# Patient Record
Sex: FEMALE | Race: WHITE | Employment: OTHER | URBAN - METROPOLITAN AREA
[De-identification: names, ages, dates, MRNs, and addresses within clinical notes are randomized per-mention and may not be internally consistent; named-entity substitution may affect disease eponyms.]

---

## 2017-01-16 ENCOUNTER — OFFICE VISIT (OUTPATIENT)
Dept: RHEUMATOLOGY | Facility: PHYSICIAN GROUP | Age: 82
End: 2017-01-16
Payer: MEDICARE

## 2017-01-16 VITALS — DIASTOLIC BLOOD PRESSURE: 60 MMHG | SYSTOLIC BLOOD PRESSURE: 120 MMHG | WEIGHT: 95 LBS

## 2017-01-16 DIAGNOSIS — R41.3 MEMORY LOSS: ICD-10-CM

## 2017-01-16 DIAGNOSIS — M81.0 OSTEOPOROSIS: ICD-10-CM

## 2017-01-16 DIAGNOSIS — M05.9 SEROPOSITIVE RHEUMATOID ARTHRITIS (HCC): ICD-10-CM

## 2017-01-16 PROCEDURE — 99214 OFFICE O/P EST MOD 30 MIN: CPT | Performed by: INTERNAL MEDICINE

## 2017-01-16 NOTE — Clinical Note
[facility]   [address], [address]  [place] [address]  Phone: [phone]  Fax: [phone]              Encounter Date: 1/16/2017    Dear Dr. Scott ref. provider found,    It was a pleasure seeing your patient, Pham Sparks, on 1/16/2017. Diagnoses of Seropositive rheumatoid arthritis (CMS-HCC), Osteoporosis, and Memory loss were pertinent to this visit.     Please find attached progress note which includes the history I obtained from Ms. Sparks, my physical examination findings, my impression and recommendations.      Once again, it was a pleasure participating in your patient's care.  Please feel free to contact me if you have any questions or if I can be of any further assistance to your patients.      Sincerely,    Racquel Rousseau M.D.  Electronically Signed          PROGRESS NOTE:  No notes on file

## 2017-01-16 NOTE — PROGRESS NOTES
Chief Complaint- joint pain    Subjective:   Pham Sparks is a 89 y.o. female here today for follow up of rheumatological issues    This is a pleasant elderly patient here with her son who has Seropositive rheumatoid arthritis and currently takes methotrexate 10 mg by mouth every week.  Here to f/u, doing well, here with her son, no complaints, no unexplained weight loss, no recurrent infections, no FUO, no complaints of hair loss  No stomach problems from MTX  Pt states that she has no pain  Patient also with osteoporosis, currently on Prolia, last Prolia injection July 2016 and is scheduled for her next Prolia injection February 16, 2017.  Last DEXA was done 7/2/15 T scores -2.2 in LS spine, -6.8 in forearm,   Patient sees dermatology on a regular basis for actinic keratoses on the face. Patient today has a little bit of an upper respiratory infection with slight yellow phlegm production but no fevers, no shortness of breath.    S/p Plaquenil  S/p Fosamax    Left TKA    DEXA 7/2015 T scores -2.2, -6.8,   Last Prolia 7/2016, next due February 16, 2017      Current medicines (including changes today)  Current Outpatient Prescriptions   Medication Sig Dispense Refill   • folic acid (FOLVITE) 1 MG Tab TAKE 1 TABLET BY MOUTH EVERY DAY 90 Tab 3   • methotrexate 2.5 MG Tab 4 tabs po qweek 57 Tab 0   • levothyroxine (SYNTHROID) 100 MCG TABS Take 100 mcg by mouth every day.     • calcium carbonate (OS-KYLE 500) 1250 MG TABS Take 1,250 mg by mouth every day.     • atenolol (TENORMIN) 25 MG TABS Take 25 mg by mouth every day.     • Rivastigmine Tartrate (EXELON PO) Take  by mouth.     • denosumab (PROLIA) 60 MG/ML Solution Inject 60 mg as instructed Once.     • vitamin D (CHOLECALCIFEROL) 1000 UNIT TABS Take 1,000 Units by mouth every day.       Current Facility-Administered Medications   Medication Dose Route Frequency Provider Last Rate Last Dose   • denosumab (PROLIA) subq injection 60 mg  60 mg Subcutaneous  Q6 MO Racquel Rousseau M.D.         She  has a past medical history of Menopause; cholecystectomy; Actinic keratosis; Arthritis; and Skin carcinoma.    ROS   Other than what is mentioned in HPI or physical exam, there is no history of headaches, double vision or blurred vision. No temporal tenderness or jaw claudication. No history of cataracts or glaucoma. No trouble swallowing difficulties or sore throats. No history of thyroid disease. No chest complaints including chest pain, cough or sputum production. No shortness of breath. No GI complaints including nausea, vomiting, change in bowel habits, or past peptic ulcer disease. No history of blood in the stools. No urinary complaints including dysuria or frequency. No history of rash including psoriasis. No history of alopecia, photosensitivity, oral ulcerations, Raynaud's phenomena, or swollen joints. No history of gout. No back complaints. No history of low blood counts.       Objective:     Blood pressure 120/60, weight 43.092 kg (95 lb). There is no height on file to calculate BMI.   Physical Exam:  Consitution: Alert, oriented X 3 in no acute distress,Eye contact is good, speech goal directed, affect calmHEENT: conjunctiva non-injected, sclera non-icteric.Pinna normal. TM pearly gray, no injection of TMs. Oral: mucous membranes pink and moist with no lesions, there is mild postnasal drip but no evidence of exudates, no parotid gland enlargementNeck: No adenopathy or masses in the neck or supraclavicular regions.Lymph: No supraclavicular lymphadenopathy, no axillary lymphadenopathy, no cervical lymphadenopathyLungs: clear to auscultation bilaterally with good excursion.CV: regular rate and rhythm.Abdomen: soft, nontender, No CVAT, no HSMNeuro: Cranial nerves 2-12 are grossly intact, there is a slight intention tremor of the handsSkin: There are actinic keratoses on face,Ext: Patient has  soft fluctuant not inflamed cysts on the right ulnar styloid,  slight  PIP joint nodule, bunion deformities noted on great toes, no crossover toes, and no hammertoes noted on feet, There is 1+ pitting edema bilateral lower extremities, no ligament laxity noted neither on upper extremities or lower extremities, mild kyphosis of the upper back      Results for orders placed in visit on 07/02/15   DS-BONE DENSITY STUDY (DEXA)    Impression According to the World Health Organization classification, bone mineral density of this patient is osteoporosis with high risk of fracture.                INTERPRETING LOCATION:  [address] [place] [address], [place], [address]       Assessment and Plan:     1. Seropositive rheumatoid arthritis (CMS-HCC)  Continue methotrexate at 10 mg by mouth every week and folic acid 1 mg by mouth daily, labs to be done every 3 months, labs ordered today  - CBC WITH DIFFERENTIAL; Future  - COMP METABOLIC PANEL; Future  - WESTERGREN SED RATE; Future  - denosumab (PROLIA) subq injection 60 mg; Inject 1 mL as instructed every 6 months.    2. Osteoporosis  Patient will be due for a Prolia injection February 16, 2017,  - CBC WITH DIFFERENTIAL; Future  - COMP METABOLIC PANEL; Future  - WESTERGREN SED RATE; Future  - denosumab (PROLIA) subq injection 60 mg; Inject 1 mL as instructed every 6 months.    3. Memory loss    Followup: Return in about 3 months (around 4/16/2017). or sooner prn    Patient was seen 30 minutes face-to-face of which more than 50% of the time was spent counseling the patient (excluding time for procedures)  regarding  rheumatological condition and care. Therapy was discussed in detail.    Please note that this dictation was created using voice recognition software. I have made every reasonable attempt to correct obvious errors, but I expect that there are errors of grammar and possibly content that I did not discover before finalizing the note.

## 2017-01-16 NOTE — MR AVS SNAPSHOT
Pham GASTELUM Brieperthwasuzanna   2017 10:00 AM   Office Visit   MRN: [id number]    Department:  Rheumatology Med [facility]   Dept Phone:  [phone]    Description:  Female : 3/9/1925   Provider:  Racquel Rousseau M.D.           Reason for Visit     Follow-Up           Allergies as of 2017     No Known Allergies      You were diagnosed with     Seropositive rheumatoid arthritis (CMS-HCC)   [201788]       Osteoporosis   [0242252]       Memory loss   [780.93.ICD-9-CM]         Vital Signs     Blood Pressure Weight Smoking Status             120/60 mmHg 43.092 kg (95 lb) Former Smoker         Basic Information     Date Of Birth Sex Race Ethnicity Preferred Language    3/9/1925 Female White Unknown English      Your appointments     2017 10:00 AM   Infusion Other with [facility] INFUSION ROOM   [facility] (--)    [address]  [facility] [address] [phone]            May 17, 2017 10:15 AM   Follow Up Visit with Racquel Rousseau M.D.   [facility] (--)    [address], [address]  [facility] [address] [phone]           You will be receiving a confirmation call a few days before your appointment from our automated call confirmation system.              Problem List              ICD-10-CM Priority Class Noted - Resolved    Seropositive rheumatoid arthritis (CMS-HCC) M05.9   4/15/2014 - Present    Osteoporosis M81.0   4/15/2014 - Present    Memory loss R41.3   4/15/2014 - Present    Actinic keratoses L57.0   2015 - Present    Thyroid activity decreased E03.9   2016 - Present      Health Maintenance        Date Due Completion Dates    IMM DTaP/Tdap/Td Vaccine (1 - Tdap) 3/9/1944 ---    PAP SMEAR 3/9/1946 ---    MAMMOGRAM 3/9/1965 ---    COLONOSCOPY 3/9/1975 ---    IMM ZOSTER VACCINE 3/9/1985 ---    IMM PNEUMOCOCCAL 65+ (ADULT) LOW/MEDIUM RISK SERIES (1 of 2 - PCV13) 3/9/1990 ---    IMM INFLUENZA (1) 2016 ---    BONE DENSITY 2020  7/2/2015            Current Immunizations     No immunizations on file.      Below and/or attached are the medications your provider expects you to take. Review all of your home medications and newly ordered medications with your provider and/or pharmacist. Follow medication instructions as directed by your provider and/or pharmacist. Please keep your medication list with you and share with your provider. Update the information when medications are discontinued, doses are changed, or new medications (including over-the-counter products) are added; and carry medication information at all times in the event of emergency situations     Allergies:  No Known Allergies          Medications  Valid as of: January 16, 2017 - 10:17 AM    Generic Name Brand Name Tablet Size Instructions for use    Atenolol (Tab) TENORMIN 25 MG Take 25 mg by mouth every day.        Calcium Carbonate (Tab) OS-KYLE 500 1250 (500 CA) MG Take 1,250 mg by mouth every day.        Cholecalciferol (Tab) cholecalciferol 1000 UNIT Take 1,000 Units by mouth every day.        Denosumab (Solution) PROLIA 60 MG/ML Inject 60 mg as instructed Once.        Folic Acid (Tab) FOLVITE 1 MG TAKE 1 TABLET BY MOUTH EVERY DAY        Levothyroxine Sodium (Tab) SYNTHROID 100 MCG Take 100 mcg by mouth every day.        Methotrexate Sodium (Tab) methotrexate 2.5 MG 4 tabs po qweek        Methotrexate Sodium (Tab) methotrexate 2.5 MG TAKE 4 TABLETS BY MOUTH EVERY WEEK        Rivastigmine Tartrate   Take  by mouth.        .                 Medicines prescribed today were sent to:     [facility] [id number] [place] [address] [address] [place] [address]    Phone: [phone] Fax: [phone]    Open 24 Hours?: No      Medication refill instructions:       If your prescription bottle indicates you have medication refills left, it is not necessary to call your provider’s office. Please contact your pharmacy  and they will refill your medication.    If your prescription bottle indicates you do not have any refills left, you may request refills at any time through one of the following ways: The online "[employer] system (except Urgent Care), by calling your provider’s office, or by asking your pharmacy to contact your provider’s office with a refill request. Medication refills are processed only during regular business hours and may not be available until the next business day. Your provider may request additional information or to have a follow-up visit with you prior to refilling your medication.   *Please Note: Medication refills are assigned a new Rx number when refilled electronically. Your pharmacy may indicate that no refills were authorized even though a new prescription for the same medication is available at the pharmacy. Please request the medicine by name with the pharmacy before contacting your provider for a refill.        Your To Do List     Future Labs/Procedures Complete By Expires    CBC WITH DIFFERENTIAL  As directed 1/16/2018    COMP METABOLIC PANEL  As directed 1/16/2018    WESTERGREN SED RATE  As directed 1/16/2018         "[employer] Access Code: [email or web address]  Expires: 2/15/2017 10:17 AM    "[employer]  A secure, online tool to manage your health information     [employer]’s "[employer]® is a secure, online tool that connects you to your personalized health information from the privacy of your home -- day or night - making it very easy for you to manage your healthcare. Once the activation process is completed, you can even access your medical information using the "[employer] angeles, which is available for free in the Apple Angeles store or Google Play store.     "[employer] provides the following levels of access (as shown below):   My Chart Features   [facility] Doctor [facility]  Specialists [facility]  Urgent  Care Non-[facility]  Primary Care  Doctor   Email your healthcare team securely and privately 24/7 X X X     Manage appointments: schedule your next appointment; view details of past/upcoming appointments X      Request prescription refills. X      View recent personal medical records, including lab and immunizations X X X X   View health record, including health history, allergies, medications X X X X   Read reports about your outpatient visits, procedures, consult and ER notes X X X X   See your discharge summary, which is a recap of your hospital and/or ER visit that includes your diagnosis, lab results, and care plan. X X       How to register for Petcube:  1. Go to  [email or web address].  2. Click on the Sign Up Now box, which takes you to the New Member Sign Up page. You will need to provide the following information:  a. Enter your Petcube Access Code exactly as it appears at the top of this page. (You will not need to use this code after you’ve completed the sign-up process. If you do not sign up before the expiration date, you must request a new code.)   b. Enter your date of birth.   c. Enter your home email address.   d. Click Submit, and follow the next screen’s instructions.  3. Create a Petcube ID. This will be your Petcube login ID and cannot be changed, so think of one that is secure and easy to remember.  4. Create a Petcube password. You can change your password at any time.  5. Enter your Password Reset Question and Answer. This can be used at a later time if you forget your password.   6. Enter your e-mail address. This allows you to receive e-mail notifications when new information is available in Petcube.  7. Click Sign Up. You can now view your health information.    For assistance activating your Petcube account, call [phone]

## 2017-02-16 ENCOUNTER — NON-PROVIDER VISIT (OUTPATIENT)
Dept: RHEUMATOLOGY | Facility: PHYSICIAN GROUP | Age: 82
End: 2017-02-16
Payer: MEDICARE

## 2017-02-16 DIAGNOSIS — M81.0 OSTEOPOROSIS: ICD-10-CM

## 2017-02-16 PROCEDURE — 96372 THER/PROPH/DIAG INJ SC/IM: CPT | Performed by: INTERNAL MEDICINE

## 2017-02-16 NOTE — MR AVS SNAPSHOT
Pham G Guerlinehwasuzanna   2017 10:00 AM   Appointment   MRN: [id number]    Department:  Rheumatology Med [facility]   Dept Phone:  [phone]    Description:  Female : 3/9/1925   Provider:  [facility]           Allergies as of 2017     No Known Allergies      Vital Signs     Smoking Status                   Former Smoker           Basic Information     Date Of Birth Sex Race Ethnicity Preferred Language    3/9/1925 Female White Unknown English      Your appointments     May 17, 2017 10:15 AM   Follow Up Visit with Racquel Rousseau M.D.   [facility] (--)    [address], [address]  [place] [address] [phone]           You will be receiving a confirmation call a few days before your appointment from our automated call confirmation system.            Aug 17, 2017 10:00 AM   Infusion Other with [facility] INF[facility]   [facility] (--)    [address], [address]  [facility] [address] [phone]              Problem List              ICD-10-CM Priority Class Noted - Resolved    Seropositive rheumatoid arthritis (CMS-HCC) M05.9   4/15/2014 - Present    Osteoporosis M81.0   4/15/2014 - Present    Memory loss R41.3   4/15/2014 - Present    Actinic keratoses L57.0   2015 - Present    Thyroid activity decreased E03.9   2016 - Present      Health Maintenance        Date Due Completion Dates    IMM DTaP/Tdap/Td Vaccine (1 - Tdap) 3/9/1944 ---    PAP SMEAR 3/9/1946 ---    MAMMOGRAM 3/9/1965 ---    COLONOSCOPY 3/9/1975 ---    IMM ZOSTER VACCINE 3/9/1985 ---    IMM PNEUMOCOCCAL 65+ (ADULT) LOW/MEDIUM RISK SERIES (1 of 2 - PCV13) 3/9/1990 ---    IMM INFLUENZA (1) 2016 ---    BONE DENSITY 2020            Current Immunizations     No immunizations on file.      Below and/or attached are the medications your provider expects you to take. Review all of your home medications and newly ordered medications with your provider and/or pharmacist.  Follow medication instructions as directed by your provider and/or pharmacist. Please keep your medication list with you and share with your provider. Update the information when medications are discontinued, doses are changed, or new medications (including over-the-counter products) are added; and carry medication information at all times in the event of emergency situations     Allergies:  No Known Allergies          Medications  Valid as of: February 16, 2017 - 10:13 AM    Generic Name Brand Name Tablet Size Instructions for use    Atenolol (Tab) TENORMIN 25 MG Take 25 mg by mouth every day.        Calcium Carbonate (Tab) OS-KYLE 500 1250 (500 CA) MG Take 1,250 mg by mouth every day.        Cholecalciferol (Tab) cholecalciferol 1000 UNIT Take 1,000 Units by mouth every day.        Denosumab (Solution) PROLIA 60 MG/ML Inject 60 mg as instructed Once.        Folic Acid (Tab) FOLVITE 1 MG TAKE 1 TABLET BY MOUTH EVERY DAY        Levothyroxine Sodium (Tab) SYNTHROID 100 MCG Take 100 mcg by mouth every day.        Methotrexate Sodium (Tab) methotrexate 2.5 MG 4 tabs po qweek        Rivastigmine Tartrate   Take  by mouth.        .                 Medicines prescribed today were sent to:     [facility] [address] [place] [address] AT [facility]    [address] [place] [address]    Phone: [phone] Fax: [phone]    Open 24 Hours?: No      Medication refill instructions:       If your prescription bottle indicates you have medication refills left, it is not necessary to call your provider’s office. Please contact your pharmacy and they will refill your medication.    If your prescription bottle indicates you do not have any refills left, you may request refills at any time through one of the following ways: The online [employer]ets system (except Urgent Care), by calling your provider’s office, or by asking your pharmacy to contact your provider’s office with a refill  request. Medication refills are processed only during regular business hours and may not be available until the next business day. Your provider may request additional information or to have a follow-up visit with you prior to refilling your medication.   *Please Note: Medication refills are assigned a new Rx number when refilled electronically. Your pharmacy may indicate that no refills were authorized even though a new prescription for the same medication is available at the pharmacy. Please request the medicine by name with the pharmacy before contacting your provider for a refill.           Quintesocial Access Code: [email or web address]  Expires: 3/18/2017 10:13 AM    Quint[employer]ocial  A secure, online tool to manage your health information     [employer]’s Quintesocial® is a secure, online tool that connects you to your personalized health information from the privacy of your home -- day or night - making it very easy for you to manage your healthcare. Once the activation process is completed, you can even access your medical information using the Quintesocial angeles, which is available for free in the Apple Angeles store or Google Play store.     Quint[employer]ocial provides the following levels of access (as shown below):   My Chart Features   [facility] Primary [facility] Doctor [facility]  Specialists [facility]  Urgent  Care Non-[facility]  Primary Care  Doctor   Email your healthcare team securely and privately 24/7 X X X    Manage appointments: schedule your next appointment; view details of past/upcoming appointments X      Request prescription refills. X      View recent personal medical records, including lab and immunizations X X X X   View health record, including health history, allergies, medications X X X X   Read reports about your outpatient visits, procedures, consult and ER notes X X X X   See your discharge summary, which is a recap of your hospital and/or ER visit that includes your diagnosis, lab results, and care plan. X X       How to register for  MyChart:  1. Go to  [email or web address].  2. Click on the Sign Up Now box, which takes you to the New Member Sign Up page. You will need to provide the following information:  a. Enter your AudioEye Access Code exactly as it appears at the top of this page. (You will not need to use this code after you’ve completed the sign-up process. If you do not sign up before the expiration date, you must request a new code.)   b. Enter your date of birth.   c. Enter your home email address.   d. Click Submit, and follow the next screen’s instructions.  3. Create a Rock Flow Dynamicst ID. This will be your AudioEye login ID and cannot be changed, so think of one that is secure and easy to remember.  4. Create a Rock Flow Dynamicst password. You can change your password at any time.  5. Enter your Password Reset Question and Answer. This can be used at a later time if you forget your password.   6. Enter your e-mail address. This allows you to receive e-mail notifications when new information is available in AudioEye.  7. Click Sign Up. You can now view your health information.    For assistance activating your AudioEye account, call [phone]

## 2017-02-16 NOTE — PROGRESS NOTES
Pham Sparks is a 89 y.o. female here for a non-provider visit for 60MG SQ Prolia injection.      Reason for injection: Osteoporosis  Order in MAR?: Yes  Patient supplied?:No  Minimum interval has been met for this injection (per MAR order): Yes    Patient tolerated injection and no adverse effects were observed or reported YES.    # of Administrations remaining in MAR:6

## 2017-05-18 ENCOUNTER — OFFICE VISIT (OUTPATIENT)
Dept: RHEUMATOLOGY | Facility: PHYSICIAN GROUP | Age: 82
End: 2017-05-18
Payer: MEDICARE

## 2017-05-18 VITALS
RESPIRATION RATE: 14 BRPM | TEMPERATURE: 98.8 F | DIASTOLIC BLOOD PRESSURE: 50 MMHG | SYSTOLIC BLOOD PRESSURE: 120 MMHG | HEART RATE: 60 BPM | OXYGEN SATURATION: 97 % | WEIGHT: 97 LBS

## 2017-05-18 DIAGNOSIS — M81.0 AGE-RELATED OSTEOPOROSIS WITHOUT CURRENT PATHOLOGICAL FRACTURE: ICD-10-CM

## 2017-05-18 DIAGNOSIS — R41.3 MEMORY LOSS: ICD-10-CM

## 2017-05-18 DIAGNOSIS — Z79.631 ON METHOTREXATE THERAPY: ICD-10-CM

## 2017-05-18 DIAGNOSIS — M05.9 SEROPOSITIVE RHEUMATOID ARTHRITIS (HCC): ICD-10-CM

## 2017-05-18 DIAGNOSIS — E03.9 HYPOTHYROIDISM, UNSPECIFIED TYPE: ICD-10-CM

## 2017-05-18 PROCEDURE — 1101F PT FALLS ASSESS-DOCD LE1/YR: CPT | Mod: 8P | Performed by: INTERNAL MEDICINE

## 2017-05-18 PROCEDURE — G8421 BMI NOT CALCULATED: HCPCS | Performed by: INTERNAL MEDICINE

## 2017-05-18 PROCEDURE — 4040F PNEUMOC VAC/ADMIN/RCVD: CPT | Mod: 8P | Performed by: INTERNAL MEDICINE

## 2017-05-18 PROCEDURE — 1036F TOBACCO NON-USER: CPT | Performed by: INTERNAL MEDICINE

## 2017-05-18 PROCEDURE — 99214 OFFICE O/P EST MOD 30 MIN: CPT | Performed by: INTERNAL MEDICINE

## 2017-05-18 PROCEDURE — G8432 DEP SCR NOT DOC, RNG: HCPCS | Performed by: INTERNAL MEDICINE

## 2017-05-18 NOTE — Clinical Note
[facility]   [address], [address]  [place] [address]  Phone: [phone]  Fax: [phone]              Encounter Date: 5/18/2017    Dear Dr. Scott ref. provider found,    It was a pleasure seeing your patient, Pham Sparks, on 5/18/2017. Diagnoses of Seropositive rheumatoid arthritis (CMS-HCC), On methotrexate therapy, Age-related osteoporosis without current pathological fracture, Hypothyroidism, unspecified type, and Memory loss were pertinent to this visit.     Please find attached progress note which includes the history I obtained from Ms. Sparks, my physical examination findings, my impression and recommendations.      Once again, it was a pleasure participating in your patient's care.  Please feel free to contact me if you have any questions or if I can be of any further assistance to your patients.      Sincerely,    Racquel Rousseau M.D.  Electronically Signed          PROGRESS NOTE:  No notes on file

## 2017-05-18 NOTE — MR AVS SNAPSHOT
Pham Cunhahwasuzanna   2017 10:45 AM   Office Visit   MRN: [id number]    Department:  Rheumatology Med [facility]   Dept Phone:  [phone]    Description:  Female : 3/9/1925   Provider:  Racquel Rousseau M.D.           Reason for Visit     Follow-Up           Allergies as of 2017     No Known Allergies      You were diagnosed with     Seropositive rheumatoid arthritis (CMS-HCC)   [117735]       Age-related osteoporosis without current pathological fracture   [989970]       Hypothyroidism, unspecified type   [7290841]       Memory loss   [780.93.ICD-9-CM]         Vital Signs     Blood Pressure Pulse Temperature Respirations Weight Oxygen Saturation    120/50 mmHg 60 37.1 °C (98.8 °F) 14 43.999 kg (97 lb) 97%    Smoking Status                   Former Smoker           Basic Information     Date Of Birth Sex Race Ethnicity Preferred Language    3/9/1925 Female White Unknown English      Your appointments     Aug 17, 2017 10:00 AM   [place] [facility] [place]   [facility] (--)    [address] [address] [phone]            Aug 17, 2017  2:00 PM   Follow Up Visit with Racquel Rousseau M.D.   [facility] (--)    [address] [address] [phone]           You will be receiving a confirmation call a few days before your appointment from our automated call confirmation system.              Problem List              ICD-10-CM Priority Class Noted - Resolved    Seropositive rheumatoid arthritis (CMS-[facility]) M05.9   4/15/2014 - Present    Osteoporosis M81.0   4/15/2014 - Present    Memory loss R41.3   4/15/2014 - Present    Actinic keratoses L57.0   2015 - Present    Thyroid activity decreased E03.9   2016 - Present      Health Maintenance        Date Due Completion Dates    IMM DTaP/Tdap/Td Vaccine (1 - Tdap) 3/9/1944 ---    PAP SMEAR 3/9/1946 ---    MAMMOGRAM 3/9/1965 ---    COLONOSCOPY 3/9/1975  ---    IMM ZOSTER VACCINE 3/9/1985 ---    IMM PNEUMOCOCCAL 65+ (ADULT) LOW/MEDIUM RISK SERIES (1 of 2 - PCV13) 3/9/1990 ---    BONE DENSITY 7/2/2020 7/2/2015            Current Immunizations     No immunizations on file.      Below and/or attached are the medications your provider expects you to take. Review all of your home medications and newly ordered medications with your provider and/or pharmacist. Follow medication instructions as directed by your provider and/or pharmacist. Please keep your medication list with you and share with your provider. Update the information when medications are discontinued, doses are changed, or new medications (including over-the-counter products) are added; and carry medication information at all times in the event of emergency situations     Allergies:  No Known Allergies          Medications  Valid as of: May 18, 2017 - 11:03 AM    Generic Name Brand Name Tablet Size Instructions for use    Atenolol (Tab) TENORMIN 25 MG Take 25 mg by mouth every day.        Calcium Carbonate (Tab) OS-KYLE 500 1250 (500 CA) MG Take 1,250 mg by mouth every day.        Cholecalciferol (Tab) cholecalciferol 1000 UNIT Take 1,000 Units by mouth every day.        Denosumab (Solution) PROLIA 60 MG/ML Inject 60 mg as instructed Once.        Folic Acid (Tab) FOLVITE 1 MG TAKE 1 TABLET BY MOUTH EVERY DAY        Levothyroxine Sodium (Tab) SYNTHROID 100 MCG Take 100 mcg by mouth every day.        Methotrexate Sodium (Tab) methotrexate 2.5 MG 4 tabs po qweek        Rivastigmine Tartrate   Take  by mouth.        .                 Medicines prescribed today were sent to:     [facility] - [address] [place] AT [facility] [place] [facility]    [facility] [place] [facility] [address]    Phone: [phone] Fax: [phone]    Open 24 Hours?: No      Medication refill instructions:       If your prescription bottle indicates you have medication refills left, it is not necessary to  call your provider’s office. Please contact your pharmacy and they will refill your medication.    If your prescription bottle indicates you do not have any refills left, you may request refills at any time through one of the following ways: The online [employer] system (except Urgent Care), by calling your provider’s office, or by asking your pharmacy to contact your provider’s office with a refill request. Medication refills are processed only during regular business hours and may not be available until the next business day. Your provider may request additional information or to have a follow-up visit with you prior to refilling your medication.   *Please Note: Medication refills are assigned a new Rx number when refilled electronically. Your pharmacy may indicate that no refills were authorized even though a new prescription for the same medication is available at the pharmacy. Please request the medicine by name with the pharmacy before contacting your provider for a refill.        Your To Do List     Future Labs/Procedures Complete By Expires    CBC WITH DIFFERENTIAL  As directed 5/18/2018    COMP METABOLIC PANEL  As directed 5/18/2018    WESTERGREN SED RATE  As directed 5/18/2018         [employer] Access Code: [email or web address]  Expires: 6/17/2017 11:03 AM    [employer]  A secure, online tool to manage your health information     [employer]’s [employer]® is a secure, online tool that connects you to your personalized health information from the privacy of your home -- day or night - making it very easy for you to manage your healthcare. Once the activation process is completed, you can even access your medical information using the [employer] [place], which is available for free in the [employer] [place] store or Google Play store.     [employer] provides the following levels of access (as shown below):   My Chart Features   [facility] Doctor [facility]  Specialists [facility]  Urgent  Care Non-[facility]  Primary Care  Doctor   Email  your healthcare team securely and privately 24/7 X X X    Manage appointments: schedule your next appointment; view details of past/upcoming appointments X      Request prescription refills. X      View recent personal medical records, including lab and immunizations X X X X   View health record, including health history, allergies, medications X X X X   Read reports about your outpatient visits, procedures, consult and ER notes X X X X   See your discharge summary, which is a recap of your hospital and/or ER visit that includes your diagnosis, lab results, and care plan. X X       How to register for [employer]:  1. Go to  [email or web address].  2. Click on the Sign Up Now box, which takes you to the New Member Sign Up page. You will need to provide the following information:  a. Enter your [employer] Access Code exactly as it appears at the top of this page. (You will not need to use this code after you’ve completed the sign-up process. If you do not sign up before the expiration date, you must request a new code.)   b. Enter your date of birth.   c. Enter your home email address.   d. Click Submit, and follow the next screen’s instructions.  3. Create a Tebla ID. This will be your [employer] login ID and cannot be changed, so think of one that is secure and easy to remember.  4. Create a [employer]bla password. You can change your password at any time.  5. Enter your Password Reset Question and Answer. This can be used at a later time if you forget your password.   6. Enter your e-mail address. This allows you to receive e-mail notifications when new information is available in Tebla.  7. Click Sign Up. You can now view your health information.    For assistance activating your [employer] account, call [phone]

## 2017-05-19 NOTE — PROGRESS NOTES
Chief Complaint- joint pain    Subjective:   Pham Sparks is a 89 y.o. female here today for follow up of rheumatological issues    This is a pleasant elderly patient here with her son who has Seropositive rheumatoid arthritis and currently takes methotrexate 10 mg by mouth every week.  Here to f/u, doing well, here with her son, no complaints, no unexplained weight loss, no recurrent infections, no FUO, no complaints of hair loss No stomach problems from MTX  Pt states that she has no pain, patient recently noticed a cyst on the palm of her right hand, nontender to palpation, patient and sent denies any injury  Patient also with osteoporosis, currently on Prolia, last Prolia injection July 2016 and is scheduled for her next Prolia injection February 16, 2017.  Last DEXA was done 7/2/15 T scores -2.2 in LS spine, -6.8 in forearm,   Patient sees dermatology on a regular basis for actinic keratoses on the face.       S/p Plaquenil  S/p Fosamax    Left TKA    DEXA 7/2015 T scores -2.2, -6.8,   Last Prolia  February 16, 2017         Current medicines (including changes today)  Current Outpatient Prescriptions   Medication Sig Dispense Refill   • methotrexate 2.5 MG Tab 4 tabs po qweek 57 Tab 0   • folic acid (FOLVITE) 1 MG Tab TAKE 1 TABLET BY MOUTH EVERY DAY 90 Tab 3   • levothyroxine (SYNTHROID) 100 MCG TABS Take 100 mcg by mouth every day.     • calcium carbonate (OS-KYLE 500) 1250 MG TABS Take 1,250 mg by mouth every day.     • atenolol (TENORMIN) 25 MG TABS Take 25 mg by mouth every day.     • Rivastigmine Tartrate (EXELON PO) Take  by mouth.     • denosumab (PROLIA) 60 MG/ML Solution Inject 60 mg as instructed Once.     • vitamin D (CHOLECALCIFEROL) 1000 UNIT TABS Take 1,000 Units by mouth every day.       Current Facility-Administered Medications   Medication Dose Route Frequency Provider Last Rate Last Dose   • denosumab (PROLIA) subq injection 60 mg  60 mg Subcutaneous Q6 MO Racquel Rousseau M.D.    60 mg at 02/16/17 1039     She  has a past medical history of Menopause; cholecystectomy; Actinic keratosis; Arthritis; and Skin carcinoma.    ROS   Other than what is mentioned in HPI or physical exam, there is no history of headaches, double vision or blurred vision. No temporal tenderness or jaw claudication. No history of cataracts or glaucoma. No trouble swallowing difficulties or sore throats. No history of thyroid disease. No chest complaints including chest pain, cough or sputum production. No shortness of breath. No GI complaints including nausea, vomiting, change in bowel habits, or past peptic ulcer disease. No history of blood in the stools. No urinary complaints including dysuria or frequency. No history of rash including psoriasis. No history of alopecia, photosensitivity, oral ulcerations, Raynaud's phenomena, or swollen joints. No history of gout. No back complaints. No history of low blood counts.       Objective:     Blood pressure 120/50, pulse 60, temperature 37.1 °C (98.8 °F), resp. rate 14, weight 43.999 kg (97 lb), SpO2 97 %. There is no height on file to calculate BMI.   Physical Exam:  Consitution: Alert, oriented X 3 in no acute distress,Eye contact is good, speech goal directed, affect calmHEENT: conjunctiva non-injected, sclera non-icteric.Pinna normal. TM pearly gray, no injection of TMs. Oral: mucous membranes pink and moist with no lesions,  no parotid gland enlargementNeck: No adenopathy or masses in the neck or supraclavicular regions.Lymph: No supraclavicular lymphadenopathy, no axillary lymphadenopathy, no cervical lymphadenopathyLungs: clear to auscultation bilaterally with good excursion.CV: regular rate and rhythm.Abdomen: soft, nontender, No CVAT, no HSMNeuro: Cranial nerves 2-12 are grossly intact, there is a slight intention tremor of the handsSkin: There are actinic keratoses on face,Ext: Patient has  soft fluctuant not inflamed cysts on the right ulnar styloid,  and  inclusion cyst on the palmar aspect of the right hand, nontender to palpation, non-erythematous, no evidence of lymphangitis, small palpable cyst on the dorsal aspect of the right index PIP joint as well as a small cyst on the dorsal aspect of the right fifth MCP joint. Slight PIP joint nodule, bunion deformities noted on great toes, no crossover toes, and no hammertoes noted on feet,  no ligament laxity noted neither on upper extremities or lower extremities, mild kyphosis of the upper back    Results for orders placed in visit on 07/02/15   DS-BONE DENSITY STUDY (DEXA)    Impression According to the World Health Organization classification, bone mineral density of this patient is osteoporosis with high risk of fracture.                INTERPRETING LOCATION:  [address] [place] [address], [place], [id number]      Assessment and Plan:     1. Seropositive rheumatoid arthritis (CMS-HCC)  Doing well with low-dose methotrexate at 10 mg by mouth every week, continue with folic acid 1 mg by mouth daily, of note patient's sedimentation rate is 18.  Labs to be done August 2017, labs ordered for patient  - CBC WITH DIFFERENTIAL; Future  - COMP METABOLIC PANEL; Future  - WESTERGREN SED RATE; Future  - methotrexate 2.5 MG Tab; 4 tabs po qweek  Dispense: 57 Tab; Refill: 0    2. On methotrexate therapy  Labs to be done every 3 months to monitor liver functions and CBC    3. Age-related osteoporosis without current pathological fracture  Patient currently on Prolia, next Prolia due August 2017  Last DEXA July 2015, next DEXA due July 2017, will order at next visit   continue calcium 1200 mg by mouth daily and vitamin D about 1000 units by mouth daily and magnesium 400 mg by mouth daily  - methotrexate 2.5 MG Tab; 4 tabs po qweek  Dispense: 57 Tab; Refill: 0    4. Hypothyroidism, unspecified type  Can exacerbate joint pain, I recommend monitoring thyroid on a regular basis to assure it is not contributing to the patient's joint pain    5. Memory  loss  Helped significantly by her son  - methotrexate 2.5 MG Tab; 4 tabs po qweek  Dispense: 57 Tab; Refill: 0    Followup: Return in about 3 months (around 8/18/2017). or sooner prn    Patient was seen 30 minutes face-to-face of which more than 50% of the time was spent counseling the patient (excluding time for procedures)  regarding  rheumatological condition and care. Therapy was discussed in detail.    Please note that this dictation was created using voice recognition software. I have made every reasonable attempt to correct obvious errors, but I expect that there are errors of grammar and possibly content that I did not discover before finalizing the note.

## 2017-07-20 ENCOUNTER — TELEPHONE (OUTPATIENT)
Dept: RHEUMATOLOGY | Facility: PHYSICIAN GROUP | Age: 82
End: 2017-07-20

## 2017-07-20 DIAGNOSIS — C95.90 LEUKEMIA, UNSPECIFIED LEUKEMIA TYPE: ICD-10-CM

## 2017-07-21 NOTE — TELEPHONE ENCOUNTER
Call patient and patient's son about her recent labs July 20, 2017 with an elevated white blood cell count of 21.7 and lymphocytes 65 slightly elevated also with 1+ smudge cells, asked about any infections, son and patient both stated no infection but possibly tooth abscess for which they are going to go back to dentist evaluated  Advised patient that if no infection then we need to reconsider possibly a blood pathology, we will recheck a CBC and if white blood cells continue to elevate then will be to refer to hematology, both son and patient stated understanding.

## 2017-07-27 ENCOUNTER — TELEPHONE (OUTPATIENT)
Dept: RHEUMATOLOGY | Facility: PHYSICIAN GROUP | Age: 82
End: 2017-07-27

## 2017-07-27 DIAGNOSIS — C95.90 LEUKEMIA NOT HAVING ACHIEVED REMISSION, UNSPECIFIED LEUKEMIA TYPE (HCC): ICD-10-CM

## 2017-07-27 DIAGNOSIS — M81.0 SENILE OSTEOPOROSIS: ICD-10-CM

## 2017-07-27 NOTE — TELEPHONE ENCOUNTER
"Patient with very bad memory problems, Call patient's son \"Adrian\" at [phone] and notified of patient's continued elevated white blood cell count with white blood cell morphology showing smudge cells which can be compatible with CLL  We'll refer patient to hematology/oncology for further evaluation  Patient's son \"Adrian\" states understanding    "

## 2017-08-03 ENCOUNTER — HOSPITAL ENCOUNTER (OUTPATIENT)
Dept: LAB | Facility: MEDICAL CENTER | Age: 82
End: 2017-08-03
Attending: INTERNAL MEDICINE
Payer: MEDICARE

## 2017-08-03 DIAGNOSIS — M81.0 SENILE OSTEOPOROSIS: ICD-10-CM

## 2017-08-03 LAB
ALBUMIN SERPL BCP-MCNC: 3.7 G/DL (ref 3.2–4.9)
ALBUMIN/GLOB SERPL: 1.2 G/DL
ALP SERPL-CCNC: 64 U/L (ref 30–99)
ALT SERPL-CCNC: 14 U/L (ref 2–50)
ANION GAP SERPL CALC-SCNC: 7 MMOL/L (ref 0–11.9)
AST SERPL-CCNC: 34 U/L (ref 12–45)
BILIRUB SERPL-MCNC: 0.7 MG/DL (ref 0.1–1.5)
BUN SERPL-MCNC: 29 MG/DL (ref 8–22)
CALCIUM SERPL-MCNC: 9.3 MG/DL (ref 8.5–10.5)
CHLORIDE SERPL-SCNC: 107 MMOL/L (ref 96–112)
CO2 SERPL-SCNC: 24 MMOL/L (ref 20–33)
CREAT SERPL-MCNC: 0.6 MG/DL (ref 0.5–1.4)
GFR SERPL CREATININE-BSD FRML MDRD: >60 ML/MIN/1.73 M 2
GLOBULIN SER CALC-MCNC: 3 G/DL (ref 1.9–3.5)
GLUCOSE SERPL-MCNC: 95 MG/DL (ref 65–99)
POTASSIUM SERPL-SCNC: 4.2 MMOL/L (ref 3.6–5.5)
PROT SERPL-MCNC: 6.7 G/DL (ref 6–8.2)
SODIUM SERPL-SCNC: 138 MMOL/L (ref 135–145)

## 2017-08-03 PROCEDURE — 36415 COLL VENOUS BLD VENIPUNCTURE: CPT

## 2017-08-03 PROCEDURE — 80053 COMPREHEN METABOLIC PANEL: CPT

## 2017-08-07 ENCOUNTER — OFFICE VISIT (OUTPATIENT)
Dept: HEMATOLOGY ONCOLOGY | Facility: MEDICAL CENTER | Age: 82
End: 2017-08-07
Payer: MEDICARE

## 2017-08-07 ENCOUNTER — HOSPITAL ENCOUNTER (OUTPATIENT)
Dept: LAB | Facility: MEDICAL CENTER | Age: 82
End: 2017-08-07
Attending: INTERNAL MEDICINE
Payer: MEDICARE

## 2017-08-07 VITALS
TEMPERATURE: 98.6 F | SYSTOLIC BLOOD PRESSURE: 120 MMHG | RESPIRATION RATE: 16 BRPM | WEIGHT: 101.08 LBS | OXYGEN SATURATION: 98 % | HEART RATE: 67 BPM | DIASTOLIC BLOOD PRESSURE: 60 MMHG

## 2017-08-07 DIAGNOSIS — C91.10 CLL (CHRONIC LYMPHOCYTIC LEUKEMIA) (HCC): Primary | ICD-10-CM

## 2017-08-07 DIAGNOSIS — C91.10 CLL (CHRONIC LYMPHOCYTIC LEUKEMIA) (HCC): ICD-10-CM

## 2017-08-07 PROCEDURE — 82607 VITAMIN B-12: CPT

## 2017-08-07 PROCEDURE — 88185 FLOWCYTOMETRY/TC ADD-ON: CPT | Mod: 91

## 2017-08-07 PROCEDURE — 85007 BL SMEAR W/DIFF WBC COUNT: CPT

## 2017-08-07 PROCEDURE — 85027 COMPLETE CBC AUTOMATED: CPT

## 2017-08-07 PROCEDURE — 99202 OFFICE O/P NEW SF 15 MIN: CPT | Performed by: INTERNAL MEDICINE

## 2017-08-07 PROCEDURE — 82728 ASSAY OF FERRITIN: CPT

## 2017-08-07 PROCEDURE — 82525 ASSAY OF COPPER: CPT

## 2017-08-07 PROCEDURE — 83540 ASSAY OF IRON: CPT

## 2017-08-07 PROCEDURE — 83550 IRON BINDING TEST: CPT

## 2017-08-07 PROCEDURE — 85046 RETICYTE/HGB CONCENTRATE: CPT

## 2017-08-07 PROCEDURE — 82746 ASSAY OF FOLIC ACID SERUM: CPT

## 2017-08-07 PROCEDURE — 88184 FLOWCYTOMETRY/ TC 1 MARKER: CPT

## 2017-08-07 PROCEDURE — 36415 COLL VENOUS BLD VENIPUNCTURE: CPT

## 2017-08-07 NOTE — PROGRESS NOTES
Consult Note: Hematology    Date of consultation: 8/7/2017 8:46 AM    Referring provider: Racquel Rousseau M.*    Reason for consultation:   CC: Lymphocytosis    History of presenting illness:   July 26, 2017 WBC count 18.5  lymphocytes 63%. Absolute lymphocyte count 11.5   July 20, 2017 WBC count 21 lymphocytes 65%. Smudge cells 1+  February 7, 2017. WBC count 15.3 lymphocytes 50%  November 1, 2016 WBC count 14.6 lymphocytes 53%. Rare smudge cells    Pham Sparks  is a 89 y.o. year old female who presents for evaluation of lymphocytosis and smudge cells seen on peripheral blood. She is accompanied by her son provided most of the history.  The patient denies any acute complaints. Specifically denies any new pain, shortness of breath, headaches, joint aches nausea, vomiting, weight changes, loss of appetite, night sweats or change in bowel habits.  She has not had any recent hospitalizations or infections. She was incidentally noted to have elevated white count with an elevated percentage of lymphocytes and referred to hematology.     Past Medical History:    Past Medical History   Diagnosis Date   • Arthritis    • Skin carcinoma    • Rheumatoid arthritis (CMS-HCC)    • Hypothyroidism        Past surgical history:    Past Surgical History   Procedure Laterality Date   • Cholecystectomy         Allergies:  Review of patient's allergies indicates no known allergies.    Medications:    Current Outpatient Prescriptions   Medication Sig Dispense Refill   • folic acid (FOLVITE) 1 MG Tab TAKE 1 TABLET BY MOUTH EVERY DAY 90 Tab 3   • levothyroxine (SYNTHROID) 100 MCG TABS Take 100 mcg by mouth every day.     • atenolol (TENORMIN) 25 MG TABS Take 25 mg by mouth every day.     • Rivastigmine Tartrate (EXELON PO) Take  by mouth.     • methotrexate 2.5 MG Tab 4 tabs po qweek 57 Tab 0   • denosumab (PROLIA) 60 MG/ML Solution Inject 60 mg as instructed Once.     • calcium carbonate (OS-KYLE 500) 1250 MG TABS Take  1,250 mg by mouth every day.     • vitamin D (CHOLECALCIFEROL) 1000 UNIT TABS Take 1,000 Units by mouth every day.       Current Facility-Administered Medications   Medication Dose Route Frequency Provider Last Rate Last Dose   • denosumab (PROLIA) subq injection 60 mg  60 mg Subcutaneous Q6 MO Racquel Rousseau M.D.       • denosumab (PROLIA) subq injection 60 mg  60 mg Subcutaneous Q6 MO Racquel Rousseau M.D.   60 mg at 02/16/17 1039       Social History:     Social History     Social History   • Marital Status: Unknown     Spouse Name: N/A   • Number of Children: N/A   • Years of Education: N/A     Occupational History   • Not on file.     Social History Main Topics   • Smoking status: Former Smoker   • Smokeless tobacco: Never Used   • Alcohol Use: No   • Drug Use: No   • Sexual Activity: Not on file     Other Topics Concern   • Not on file     Social History Narrative       Family History:     Family History   Problem Relation Age of Onset   • No Known Problems Mother    • No Known Problems Father        Review of Systems:  Constitutional: No fever, chills, weight loss ,malaise/fatigue.      All other review of systems are negative except what was mentioned above in the HPI.    Physical Exam:  Vitals:   /60 mmHg  Pulse 67  Temp(Src) 37 °C (98.6 °F)  Resp 16  Wt 45.85 kg (101 lb 1.3 oz)  SpO2 98%    General: Not in acute distress, alert and oriented x 3  HEENT: No pallor, icterus. Oropharynx clear.   Neck: Supple, no palpable masses.  Lymph nodes: No palpable cervical, supraclavicular, axillary or inguinal lymphadenopathy.    CVS: regular rate and rhythm, no rubs or gallops  RESP: Clear to auscultate bilaterally, no wheezing or crackles.   ABD: Soft, non tender, non distended, positive bowel sounds, no palpable organomegaly  EXT: No edema or cyanosis  CNS: Alert and oriented x3, No focal deficits.  Skin- No rash      Labs:   July 26, 2017 WBC count 18.5  lymphocytes 63%. Absolute lymphocyte count  11.5   July 20, 2017 WBC count 21 lymphocytes 65%. Smudge cells 1+  February 7, 2017. WBC count 15.3 lymphocytes 50%  November 1, 2016 WBC count 14.6 lymphocytes 53%. Rare smudge cells    Assessment and Plan:  -Lymphocytosis  -Patient has had relative leukocytosis with an elevated lymphocyte count.  -We'll check blood for flow cytometry, rule out CLL.  -If positive, patient will be a candidate for treatment as she will be Lind stage 0.  -Patient has early dementia and is not a candidate for aggressive therapy.    -Macrocytosis  -Most likely secondary to methotrexate therapy for rheumatoid arthritis.  -We'll check a B12 folate level.    She agreed and verbalized her agreement and understanding with the current plan.  I answered all questions and concerns she has at this time.   Dear  Racquel Rousseau M.*,  Thank you for allowing me to participate in her care.    Please note that this dictation was created using voice recognition software. I have made every reasonable attempt to correct obvious errors, but I expect that there are errors of grammar and possibly content that I did not discover before finalizing the note.      SIGNATURES:  Juliette Reynolds    CC:  Pcp Pt States None  Racquel Rousseau M.*    Hospital Outpatient Visit on 08/07/2017   Component Date Value Ref Range Status   • WBC 08/07/2017 19.5* 4.8 - 10.8 K/uL Final   • RBC 08/07/2017 4.09* 4.20 - 5.40 M/uL Final   • Hemoglobin 08/07/2017 14.1  12.0 - 16.0 g/dL Final   • Hematocrit 08/07/2017 42.6  37.0 - 47.0 % Final   • MCV 08/07/2017 104.2* 81.4 - 97.8 fL Final   • MCH 08/07/2017 34.5* 27.0 - 33.0 pg Final   • MCHC 08/07/2017 33.1* 33.6 - 35.0 g/dL Final   • RDW 08/07/2017 55.0* 35.9 - 50.0 fL Final   • Platelet Count 08/07/2017 150* 164 - 446 K/uL Final   • MPV 08/07/2017 11.9  9.0 - 12.9 fL Final   • Neutrophils-Polys 08/07/2017 28.80* 44.00 - 72.00 % Final   • Lymphocytes 08/07/2017 68.50* 22.00 - 41.00 % Final   • Monocytes 08/07/2017 1.80   0.00 - 13.40 % Final   • Eosinophils 08/07/2017 0.90  0.00 - 6.90 % Final   • Basophils 08/07/2017 0.00  0.00 - 1.80 % Final   • Lymphs (Absolute) 08/07/2017 13.36* 1.00 - 4.80 K/uL Final   • Monos (Absolute) 08/07/2017 0.35  0.00 - 0.85 K/uL Final   • Eos (Absolute) 08/07/2017 0.18  0.00 - 0.51 K/uL Final   • Baso (Absolute) 08/07/2017 0.00  0.00 - 0.12 K/uL Final   • Manual Diff Status 08/07/2017 PERFORMED   Final   • Neutrophils (Absolute) 08/07/2017 5.62  2.00 - 7.15 K/uL Final    Includes immature neutrophils, if present.   • Reticulocyte Count 08/07/2017 1.5  0.8 - 2.1 % Final   • Retic, Absolute 08/07/2017 0.06  0.04 - 0.06 M/uL Final   • Imm. Reticulocyte Fraction 08/07/2017 25.3* 9.3 - 17.4 % Final   • Retic Hgb Equivalent 08/07/2017 40.8* 29.0 - 35.0 pg/cell Final   • Iron 08/07/2017 84  40 - 170 ug/dL Final   • Total Iron Binding 08/07/2017 413  250 - 450 ug/dL Final   • % Saturation 08/07/2017 20  15 - 55 % Final   • Ferritin 08/07/2017 84.7  10.0 - 291.0 ng/mL Final   • Vitamin B12 -True Cobalamin 08/07/2017 492  211 - 911 pg/mL Final   • Folate -Folic Acid 08/07/2017 >23.7  >4.0 ng/mL Final   • Copper Serum 08/07/2017 109  80 - 155 ug/dL Final    Comment: INTERPRETIVE INFORMATION: Copper, Serum or Plasma  Serum copper may be elevated with infection, inflammation, stress,  and copper supplementation. In females, elevated copper may also  be caused by oral contraceptives and pregnancy (concentrations may  be elevated up to 3 times normal during the third trimester).  Serum copper may be reduced by use of corticosteroids and zinc and  by malnutrition or malabsorption.  See Compliance Statement B at [email or web address]  Performed by [facility],  [address] [place] [address] [phone]  www.[email or web address], Triston Anderson MD - Lab. Director     • Source: 08/07/2017 Blood   Final   • Number of Markers 08/07/2017 22   Final   • % CD2, Leuk/Lymph Phenotype 08/07/2017 19   Final   • % CD3, Leuk/Lymph  Phenotype 08/07/2017 17   Final   • % CD4, Leuk/Lymph Phenotype 08/07/2017 16   Final   • % CD5, Leuk/Lymph Phenotype 08/07/2017 97   Final   • % CD7, Leuk/Lymph Phenotype 08/07/2017 18   Final   • % CD8, Leuk/Lymph Phenotype 08/07/2017 2   Final   • % CD16, Leuk/Lymph Phenotype 08/07/2017 3   Final   • % CD56, Leuk/Lymph Phenotype 08/07/2017 1   Final   • % CD10, Leuk/Lymph Phenotype 08/07/2017 <1   Final   • % CD19, Leuk/Lymph Phenotype 08/07/2017 77   Final   • % CD20, Leuk/Lymph Phenotype 08/07/2017 70   Final   • % CD23, Leuk/Lymph Phenotype 08/07/2017 73   Final   • % Kappa, Leuk/Lymph Phenotype 08/07/2017 77   Final   • % Lambda, Leuk/Lymph Phenotype 08/07/2017 <1   Final   • % CD34, Leuk/Lymph Phenotype 08/07/2017 <1   Final   • % CD11b, Leuk/Lymph Phenotype 08/07/2017 <1   Final   • % CD13, Leuk/Lymph Phenotype 08/07/2017 1   Final   • % CD33, Leuk/Lymph Phenotype 08/07/2017 <1   Final   • % CD64, Leuk/Lymph Phenotype 08/07/2017 <1   Final   • % CD38, Leuk/Lymph Phenotype 08/07/2017 <1   Final   • % , Leuk/Lymph Phenotype 08/07/2017 77   Final   • % CD45, Leuk/Lymph Phenotype 08/07/2017 See Note   Final   • Impression, Leuk/Lymph Phenotype 08/07/2017 See Note   Final    Comment: PERIPHERAL BLOOD, FLOW CYTOMETRIC IMPRESSION:  CD5 positive B-cell lymphoproliferative disorder with a phenotype  characteristic of chronic lymphocytic leukemia or small  lymphocytic lymphoma (CLL/SLL). See comment.  COMMENT:  Depending on the cell count and other clinical findings, these  findings could be consistent with either monoclonal B-cell  lymphocytosis (MBL) or CLL (see Blood 2008;111:5446, and Blood  2009;113:4130). Please correlate the results from this study  morphologically and clinically for diagnosis.  ANALYSIS:  Flow cytometric analysis of the peripheral blood specimen  indicates that 52% of the CD45 positive leukocytes are  lymphocytes, 4% are monocytes, and 44% are myeloid cells. 17% of  the lymphocytes  (percentages shown) are T-cells, 2% are NK cells  and 77% are B-cells. The T-cells appear phenotypically normal but  have an elevated CD4:CD8 ratio of 8.0. The B-cells are monotypic  and express dim kappa light chains, CD5, CD19, dim CD20, CD23, and  , but do not                            express CD10 or CD38. The myeloid cells  demonstrate phenotypically normal expression patterns for the  markers evaluated, including CD10, CD13, CD16, CD33, and CD64,  with rare immature forms, based on the lack of CD11b. The  viability of the specimen is 99%.  These results have been reviewed and approved by Shaheen Ladd MD.  08/08/2017 12:11  INTERPRETIVE INFORMATION: Leuk/Lymph Phenotyping, Flow Cytometry  Test developed and characteristics determined by [facility]. See Compliance Statement A: [facility]/CS  Performed by [facility],  [address] [place] [id number] [phone]  www.[facility], Triston Anderson MD - Lab. Director     Hospital Outpatient Visit on 08/03/2017   Component Date Value Ref Range Status   • Sodium 08/03/2017 138  135 - 145 mmol/L Final   • Potassium 08/03/2017 4.2  3.6 - 5.5 mmol/L Final   • Chloride 08/03/2017 107  96 - 112 mmol/L Final   • Co2 08/03/2017 24  20 - 33 mmol/L Final   • Anion Gap 08/03/2017 7.0  0.0 - 11.9 Final   • Glucose 08/03/2017 95  65 - 99 mg/dL Final   • Bun 08/03/2017 29* 8 - 22 mg/dL Final   • Creatinine 08/03/2017 0.60  0.50 - 1.40 mg/dL Final   • Calcium 08/03/2017 9.3  8.5 - 10.5 mg/dL Final   • AST(SGOT) 08/03/2017 34  12 - 45 U/L Final   • ALT(SGPT) 08/03/2017 14  2 - 50 U/L Final   • Alkaline Phosphatase 08/03/2017 64  30 - 99 U/L Final   • Total Bilirubin 08/03/2017 0.7  0.1 - 1.5 mg/dL Final   • Albumin 08/03/2017 3.7  3.2 - 4.9 g/dL Final   • Total Protein 08/03/2017 6.7  6.0 - 8.2 g/dL Final   • Globulin 08/03/2017 3.0  1.9 - 3.5 g/dL Final   • A-G Ratio 08/03/2017 1.2   Final   • GFR If  08/03/2017 >60  >60 mL/min/1.73 m 2 Final    • GFR If Non  08/03/2017 >60  >60 mL/min/1.73 m 2 Final     Flow cytometry reviewed consistent with CLL.  -Patient called, discussed with Adrian who is her son  -Patient is Lind stage 0 CLL.  -Bone marrow biopsy not indicated  -No further imaging studies indicated  -We will manage with surveillance for now. The son agrees and is in agreement      Juliette Reynolds  08/16/2017 9:02 AM

## 2017-08-07 NOTE — MR AVS SNAPSHOT
Pham GASTELUM Brieperthwaite   2017 9:00 AM   Office Visit   MRN: [id number]    Department:  [facility]   Dept Phone:  [phone]    Description:  Female : 3/9/1925   Provider:  Juliette Reynolds M.D.           Reason for Visit     New Patient Ref by Racquel Rousseau Dx: Leukemia      Allergies as of 2017     No Known Allergies      You were diagnosed with     CLL (chronic lymphocytic leukemia) (CMS-HCC)   [088430]  -  Primary       Vital Signs     Blood Pressure Pulse Temperature Respirations Weight Oxygen Saturation    120/60 mmHg 67 37 °C (98.6 °F) 16 45.85 kg (101 lb 1.3 oz) 98%    Smoking Status                   Former Smoker           Basic Information     Date Of Birth Sex Race Ethnicity Preferred Language    3/9/1925 Female White Unknown English      Your appointments     Aug 17, 2017 10:00 AM   Infusion Other with RM INFUSION ROOM   [facility] (--)    [address], [address]  [place] [id number] [phone]            Aug 17, 2017  2:00 PM   Follow Up Visit with Racquel Rousseau M.D.   [facility] (--)    [address], [address]  [place] [address] [phone]           You will be receiving a confirmation call a few days before your appointment from our automated call confirmation system.              Problem List              ICD-10-CM Priority Class Noted - Resolved    Seropositive rheumatoid arthritis (CMS-HCC) M05.9   4/15/2014 - Present    Osteoporosis M81.0   4/15/2014 - Present    Memory loss R41.3   4/15/2014 - Present    Actinic keratoses L57.0   2015 - Present    Thyroid activity decreased E03.9   2016 - Present      Health Maintenance        Date Due Completion Dates    IMM DTaP/Tdap/Td Vaccine (1 - Tdap) 3/9/1944 ---    PAP SMEAR 3/9/1946 ---    MAMMOGRAM 3/9/1965 ---    COLONOSCOPY 3/9/1975 ---    IMM ZOSTER VACCINE 3/9/1985 ---    IMM PNEUMOCOCCAL 65+ (ADULT) LOW/MEDIUM RISK SERIES (1 of 2 - PCV13) 3/9/1990 ---     IMM INFLUENZA (1) 9/1/2017 ---    BONE DENSITY 7/2/2020 7/2/2015            Current Immunizations     No immunizations on file.      Below and/or attached are the medications your provider expects you to take. Review all of your home medications and newly ordered medications with your provider and/or pharmacist. Follow medication instructions as directed by your provider and/or pharmacist. Please keep your medication list with you and share with your provider. Update the information when medications are discontinued, doses are changed, or new medications (including over-the-counter products) are added; and carry medication information at all times in the event of emergency situations     Allergies:  No Known Allergies          Medications  Valid as of: August 07, 2017 -  9:16 AM    Generic Name Brand Name Tablet Size Instructions for use    Atenolol (Tab) TENORMIN 25 MG Take 25 mg by mouth every day.        Calcium Carbonate (Tab) OS-KYLE 500 1250 (500 CA) MG Take 1,250 mg by mouth every day.        Cholecalciferol (Tab) cholecalciferol 1000 UNIT Take 1,000 Units by mouth every day.        Denosumab (Solution) PROLIA 60 MG/ML Inject 60 mg as instructed Once.        Folic Acid (Tab) FOLVITE 1 MG TAKE 1 TABLET BY MOUTH EVERY DAY        Levothyroxine Sodium (Tab) SYNTHROID 100 MCG Take 100 mcg by mouth every day.        Methotrexate Sodium (Tab) methotrexate 2.5 MG 4 tabs po qweek        Rivastigmine Tartrate   Take  by mouth.        .                 Medicines prescribed today were sent to:     [facility] [address] [place] [address] MARGE NOONAN AT [facility] [address]    Phone: [phone] Fax: [phone]    Open 24 Hours?: No      Medication refill instructions:       If your prescription bottle indicates you have medication refills left, it is not necessary to call your provider’s office. Please contact your pharmacy and they will refill your  medication.    If your prescription bottle indicates you do not have any refills left, you may request refills at any time through one of the following ways: The online [employer] system (except Urgent Care), by calling your provider’s office, or by asking your pharmacy to contact your provider’s office with a refill request. Medication refills are processed only during regular business hours and may not be available until the next business day. Your provider may request additional information or to have a follow-up visit with you prior to refilling your medication.   *Please Note: Medication refills are assigned a new Rx number when refilled electronically. Your pharmacy may indicate that no refills were authorized even though a new prescription for the same medication is available at the pharmacy. Please request the medicine by name with the pharmacy before contacting your provider for a refill.        Your To Do List     Future Labs/Procedures Complete By Expires    CBC without Differential  As directed 8/1/2018    Copper, Serum  As directed 8/1/2018    Differential Manual  As directed 8/1/2018    Ferritin  As directed 8/1/2018    Folate  As directed 8/1/2018    Iron/Total Iron Bind  As directed 8/1/2018    LEUK/LYMPH PHENOTYPING, FLOW CYTOMETRY  As directed 8/1/2018    LEUKEMIA, CLL/LYMPHOMA  As directed 8/1/2018    PATH INTERP HEME  As directed 8/1/2018    Reticulocytes Count  As directed 8/1/2018    Vitamin B12  As directed 8/1/2018         [employer] Access Code: [email or web address]  Expires: 9/6/2017  8:32 AM    [employer]  A secure, online tool to manage your health information     [employer]’s [employer]® is a secure, online tool that connects you to your personalized health information from the privacy of your home -- day or night - making it very easy for you to manage your healthcare. Once the activation process is completed, you can even access your medical information using the [employer] justo, which is available  for free in the [employer] [place] store or Google Play store.     [employer] provides the following levels of access (as shown below):   My Chart Features   [facility] Doctor [facility]  Specialists [facility]  Urgent  Care Non-[facility]  Primary Care  Doctor   Email your healthcare team securely and privately 24/7 X X X    Manage appointments: schedule your next appointment; view details of past/upcoming appointments X      Request prescription refills. X      View recent personal medical records, including lab and immunizations X X X X   View health record, including health history, allergies, medications X X X X   Read reports about your outpatient visits, procedures, consult and ER notes X X X X   See your discharge summary, which is a recap of your hospital and/or ER visit that includes your diagnosis, lab results, and care plan. X X       How to register for [employer]:  1. Go to  [email or web address].  2. Click on the Sign Up Now box, which takes you to the New Member Sign Up page. You will need to provide the following information:  a. Enter your [employer] Access Code exactly as it appears at the top of this page. (You will not need to use this code after you’ve completed the sign-up process. If you do not sign up before the expiration date, you must request a new code.)   b. Enter your date of birth.   c. Enter your home email address.   d. Click Submit, and follow the next screen’s instructions.  3. Create a [employer] ID. This will be your [employer] login ID and cannot be changed, so think of one that is secure and easy to remember.  4. Create a [employer] password. You can change your password at any time.  5. Enter your Password Reset Question and Answer. This can be used at a later time if you forget your password.   6. Enter your e-mail address. This allows you to receive e-mail notifications when new information is available in [employer].  7. Click Sign Up. You can now view your health information.    For assistance  activating your [employer] account, call [phone]

## 2017-08-08 LAB
ACUTE LEUKEMIA MARKERS SPEC-IMP: NORMAL
BASOPHILS # BLD AUTO: 0 % (ref 0–1.8)
BASOPHILS # BLD: 0 K/UL (ref 0–0.12)
CD10 CELLS NFR SPEC: <1 %
CD11B CELLS NFR SPEC: <1 %
CD13 CELLS NFR SPEC: 1 %
CD16 CELLS NFR SPEC: 3 %
CD19 CELLS NFR SPEC: 77 %
CD2 CELLS NFR SPEC: 19 %
CD20 CELLS NFR SPEC: 70 %
CD200 LEUKLYMP PHENO Q5833: 77 %
CD23 CELLS NFR SPEC: 73 %
CD3 CELLS NFR SPEC: 17 %
CD33 CELLS NFR SPEC: <1 %
CD34 CELLS NFR SPEC: <1 %
CD38 CELLS NFR SPEC: <1 %
CD4 LEUKLYMP PHENO Q5783: 16 %
CD45 CELLS NFR SPEC: NORMAL %
CD5 CELLS NFR SPEC: 97 %
CD56 CELLS NFR SPEC: 1 %
CD64 CELLS NFR SPEC: <1 %
CD7 CELLS NFR SPEC: 18 %
CD8 LEUKLYMP PHENO Q5786: 2 %
EOSINOPHIL # BLD AUTO: 0.18 K/UL (ref 0–0.51)
EOSINOPHIL NFR BLD: 0.9 % (ref 0–6.9)
ERYTHROCYTE [DISTWIDTH] IN BLOOD BY AUTOMATED COUNT: 55 FL (ref 35.9–50)
EVENTS COUNTED SPEC: 22 MARKERS
FERRITIN SERPL-MCNC: 84.7 NG/ML (ref 10–291)
FOLATE SERPL-MCNC: >23.7 NG/ML
HCT VFR BLD AUTO: 42.6 % (ref 37–47)
HGB BLD-MCNC: 14.1 G/DL (ref 12–16)
HGB RETIC QN AUTO: 40.8 PG/CELL (ref 29–35)
IMM RETICS NFR: 25.3 % (ref 9.3–17.4)
IRON SATN MFR SERPL: 20 % (ref 15–55)
IRON SERPL-MCNC: 84 UG/DL (ref 40–170)
LYMPHOCYTES # BLD AUTO: 13.36 K/UL (ref 1–4.8)
LYMPHOCYTES NFR BLD: 68.5 % (ref 22–41)
LYMPHS KAPPA/LYMPH NFR SPEC: 77 %
LYMPHS LAMBDA/LYMPH NFR SPEC: <1 %
MANUAL DIFF BLD: ABNORMAL
MCH RBC QN AUTO: 34.5 PG (ref 27–33)
MCHC RBC AUTO-ENTMCNC: 33.1 G/DL (ref 33.6–35)
MCV RBC AUTO: 104.2 FL (ref 81.4–97.8)
MONOCYTES # BLD AUTO: 0.35 K/UL (ref 0–0.85)
MONOCYTES NFR BLD AUTO: 1.8 % (ref 0–13.4)
NEUTROPHILS # BLD AUTO: 5.62 K/UL (ref 2–7.15)
NEUTROPHILS NFR BLD: 28.8 % (ref 44–72)
PLATELET # BLD AUTO: 150 K/UL (ref 164–446)
PMV BLD AUTO: 11.9 FL (ref 9–12.9)
RBC # BLD AUTO: 4.09 M/UL (ref 4.2–5.4)
RETICS # AUTO: 0.06 M/UL (ref 0.04–0.06)
RETICS/RBC NFR: 1.5 % (ref 0.8–2.1)
SOURCE 9121: NORMAL
TIBC SERPL-MCNC: 413 UG/DL (ref 250–450)
VIT B12 SERPL-MCNC: 492 PG/ML (ref 211–911)
WBC # BLD AUTO: 19.5 K/UL (ref 4.8–10.8)

## 2017-08-09 LAB — COPPER SERPL-MCNC: 109 UG/DL (ref 80–155)

## 2017-08-17 ENCOUNTER — APPOINTMENT (OUTPATIENT)
Dept: RHEUMATOLOGY | Facility: PHYSICIAN GROUP | Age: 82
End: 2017-08-17
Payer: MEDICARE

## 2017-08-17 ENCOUNTER — NON-PROVIDER VISIT (OUTPATIENT)
Dept: RHEUMATOLOGY | Facility: PHYSICIAN GROUP | Age: 82
End: 2017-08-17
Payer: MEDICARE

## 2017-08-17 VITALS
RESPIRATION RATE: 12 BRPM | SYSTOLIC BLOOD PRESSURE: 90 MMHG | HEART RATE: 62 BPM | OXYGEN SATURATION: 94 % | WEIGHT: 100 LBS | DIASTOLIC BLOOD PRESSURE: 50 MMHG | TEMPERATURE: 99 F

## 2017-08-17 DIAGNOSIS — M81.0 AGE-RELATED OSTEOPOROSIS WITHOUT CURRENT PATHOLOGICAL FRACTURE: ICD-10-CM

## 2017-08-17 PROCEDURE — 96372 THER/PROPH/DIAG INJ SC/IM: CPT | Performed by: INTERNAL MEDICINE

## 2017-08-17 NOTE — PROGRESS NOTES
Pham Sparks is a 89 y.o. female here for a non-provider visit for Prolia 60mg  injection.    Reason for injection: Osteoporosis  Order in MAR?: Yes  Patient supplied?:No  Minimum interval has been met for this injection (per MAR order): Yes    Order and dose verified by: Dr Rousseau  Patient tolerated injection and no adverse effects were observed or reported: Yes    # of Administrations remaining in MAR: 6    NDC: 13305-113-01  LOT#: [id number]  Expiration Date: 10/19    Dose: 60mg prolia  Site: Left Back Arm    Patient educated on use and side effects of medication. Name and  verified prior to injection. Pt tolerated? Yes     Verified by Dr Rousseau    Administered by Barney Giordano at 10:16 AM.    Patient Provided Medication: no

## 2017-08-17 NOTE — MR AVS SNAPSHOT
Pham Bairdperthwaite   2017 10:00 AM   Non-Provider Visit   MRN: [id number]    Department:  [facility]   Dept Phone:  [phone]    Description:  Female : 3/9/1925   Provider:  [facility]           Reason for Visit     Osteoporosis Prolia 60mg injection      Allergies as of 2017     No Known Allergies      Vital Signs     Blood Pressure Pulse Temperature Respirations Weight Oxygen Saturation    90/50 mmHg 62 37.2 °C (99 °F) 12 45.36 kg (100 lb) 94%    Smoking Status                   Never Smoker            Basic Information     Date Of Birth Sex Race Ethnicity Preferred Language    3/9/1925 Female White Unknown English      Your appointments     2017  2:30 PM   Follow Up Visit with Racquel Rousseau M.D.   [facility] (--)    [address] [place] [address], [address]  [facility] [id number] [phone]           You will be receiving a confirmation call a few days before your appointment from our automated call confirmation system.              Problem List              ICD-10-CM Priority Class Noted - Resolved    Seropositive rheumatoid arthritis (CMS-HCC) M05.9   4/15/2014 - Present    Osteoporosis M81.0   4/15/2014 - Present    Memory loss R41.3   4/15/2014 - Present    Actinic keratoses L57.0   2015 - Present    Thyroid activity decreased E03.9   2016 - Present      Health Maintenance        Date Due Completion Dates    IMM DTaP/Tdap/Td Vaccine (1 - Tdap) 3/9/1944 ---    PAP SMEAR 3/9/1946 ---    MAMMOGRAM 3/9/1965 ---    COLONOSCOPY 3/9/1975 ---    IMM ZOSTER VACCINE 3/9/1985 ---    IMM PNEUMOCOCCAL 65+ (ADULT) LOW/MEDIUM RISK SERIES (1 of 2 - PCV13) 3/9/1990 ---    IMM INFLUENZA (1) 2017 ---    BONE DENSITY 2020            Current Immunizations     No immunizations on file.      Below and/or attached are the medications your provider expects you to take. Review all of your home medications and newly ordered medications with your  provider and/or pharmacist. Follow medication instructions as directed by your provider and/or pharmacist. Please keep your medication list with you and share with your provider. Update the information when medications are discontinued, doses are changed, or new medications (including over-the-counter products) are added; and carry medication information at all times in the event of emergency situations     Allergies:  No Known Allergies          Medications  Valid as of: August 17, 2017 - 10:17 AM    Generic Name Brand Name Tablet Size Instructions for use    Atenolol (Tab) TENORMIN 25 MG Take 25 mg by mouth every day.        Calcium Carbonate (Tab) OS-KYLE 500 1250 (500 Ca) MG Take 1,250 mg by mouth every day.        Cholecalciferol (Tab) cholecalciferol 1000 UNIT Take 1,000 Units by mouth every day.        Denosumab (Solution) PROLIA 60 MG/ML Inject 60 mg as instructed Once.        Folic Acid (Tab) FOLVITE 1 MG TAKE 1 TABLET BY MOUTH EVERY DAY        Levothyroxine Sodium (Tab) SYNTHROID 100 MCG Take 100 mcg by mouth every day.        Methotrexate Sodium (Tab) methotrexate 2.5 MG 4 tabs po qweek        Rivastigmine Tartrate   Take  by mouth.        .                 Medicines prescribed today were sent to:     [email or web address] [address] [place], [place] [address]SARITA BIRD    [address] [place] [id number]    Phone: [phone] Fax: [phone]    Open 24 Hours?: No      Medication refill instructions:       If your prescription bottle indicates you have medication refills left, it is not necessary to call your provider’s office. Please contact your pharmacy and they will refill your medication.    If your prescription bottle indicates you do not have any refills left, you may request refills at any time through one of the following ways: The online [employer] system (except Urgent Care), by calling your provider’s office, or by asking your pharmacy to contact your  provider’s office with a refill request. Medication refills are processed only during regular business hours and may not be available until the next business day. Your provider may request additional information or to have a follow-up visit with you prior to refilling your medication.   *Please Note: Medication refills are assigned a new Rx number when refilled electronically. Your pharmacy may indicate that no refills were authorized even though a new prescription for the same medication is available at the pharmacy. Please request the medicine by name with the pharmacy before contacting your provider for a refill.           [employer] Access Code: [email or web address]  Expires: 9/6/2017  8:32 AM    [employer]  A secure, online tool to manage your health information     [employer]® is a secure, online tool that connects you to your personalized health information from the privacy of your home -- day or night - making it very easy for you to manage your healthcare. Once the activation process is completed, you can even access your medical information using the [employer] angeles, which is available for free in the [employer] [place] store or Google Play store.     [employer] provides the following levels of access (as shown below):   My Chart Features   [facility] Doctor [facility]  Specialists [facility]  Urgent  Care Non-[facility]  Primary Care  Doctor   Email your healthcare team securely and privately 24/7 X X X    Manage appointments: schedule your next appointment; view details of past/upcoming appointments X      Request prescription refills. X      View recent personal medical records, including lab and immunizations X X X X   View health record, including health history, allergies, medications X X X X   Read reports about your outpatient visits, procedures, consult and ER notes X X X X   See your discharge summary, which is a recap of your hospital and/or ER visit that includes your diagnosis, lab results, and care plan. X  X       How to register for [employer]:  1. Go to  [email or web address].  2. Click on the Sign Up Now box, which takes you to the New Member Sign Up page. You will need to provide the following information:  a. Enter your [employer] Access Code exactly as it appears at the top of this page. (You will not need to use this code after you’ve completed the sign-up process. If you do not sign up before the expiration date, you must request a new code.)   b. Enter your date of birth.   c. Enter your home email address.   d. Click Submit, and follow the next screen’s instructions.  3. Create a [employer] ID. This will be your [employer] login ID and cannot be changed, so think of one that is secure and easy to remember.  4. Create a [employer] password. You can change your password at any time.  5. Enter your Password Reset Question and Answer. This can be used at a later time if you forget your password.   6. Enter your e-mail address. This allows you to receive e-mail notifications when new information is available in [employer].  7. Click Sign Up. You can now view your health information.    For assistance activating your [employer] account, call [phone]

## 2017-11-02 ENCOUNTER — TELEPHONE (OUTPATIENT)
Dept: RHEUMATOLOGY | Facility: PHYSICIAN GROUP | Age: 82
End: 2017-11-02

## 2017-11-02 ENCOUNTER — OFFICE VISIT (OUTPATIENT)
Dept: RHEUMATOLOGY | Facility: PHYSICIAN GROUP | Age: 82
End: 2017-11-02
Payer: MEDICARE

## 2017-11-02 VITALS
WEIGHT: 96 LBS | OXYGEN SATURATION: 93 % | HEART RATE: 78 BPM | SYSTOLIC BLOOD PRESSURE: 140 MMHG | RESPIRATION RATE: 16 BRPM | TEMPERATURE: 99.3 F | DIASTOLIC BLOOD PRESSURE: 60 MMHG

## 2017-11-02 DIAGNOSIS — M05.9 SEROPOSITIVE RHEUMATOID ARTHRITIS (HCC): ICD-10-CM

## 2017-11-02 DIAGNOSIS — C91.10 CLL (CHRONIC LYMPHOCYTIC LEUKEMIA) (HCC): ICD-10-CM

## 2017-11-02 DIAGNOSIS — R41.3 MEMORY LOSS: ICD-10-CM

## 2017-11-02 DIAGNOSIS — M81.0 AGE-RELATED OSTEOPOROSIS WITHOUT CURRENT PATHOLOGICAL FRACTURE: ICD-10-CM

## 2017-11-02 PROCEDURE — 99214 OFFICE O/P EST MOD 30 MIN: CPT | Performed by: INTERNAL MEDICINE

## 2017-11-02 RX ORDER — HYDROXYCHLOROQUINE SULFATE 200 MG/1
TABLET, FILM COATED ORAL
Qty: 180 TAB | Refills: 1 | Status: SHIPPED | OUTPATIENT
Start: 2017-11-02 | End: 2018-05-02 | Stop reason: SDUPTHER

## 2017-11-02 NOTE — PROGRESS NOTES
Chief Complaint- joint pain    Subjective:   Pham Sparks is a 89 y.o. female here today for follow up of rheumatological issues    This is a pleasant elderly patient here with her son who has Seropositive rheumatoid arthritis and currently takes methotrexate 10 mg by mouth every week, However since last visit, patient was diagnosed with CLL and subsequently taken off of methotrexate. Since stopping methotrexate patient's been developing cystic nodules on the extensor surfaces of her wrist and fingers predominately on the right upper extremities. Patient denies any joint problems since stopping the methotrexate.  Here to f/u, doing well, here with her son, no complaints, no unexplained weight loss, no recurrent infections, no FUO. Pt states that she has no pain, patient recently noticed a cyst on the palm of her right hand, nontender to palpation, patient and sent denies any injury  Patient also with osteoporosis, currently on Prolia. Last DEXA was done 7/2/15 T scores -2.2 in LS spine, -6.8 in forearm,   Patient sees dermatology on a regular basis for actinic keratoses on the face.         S/p Plaquenil  S/p Fosamax     Left TKA    CCP >250 1/2018-[facility]  RF 64 1/2018-[facility]  G6PD 13 adequate 1/2018-[facility]  Uric acid 2.5 1/2018 [facility]   DEXA 7/2015 T scores -2.2, -6.8,   DEXA 12/27/2017 T scores -3.0, -6.6 (forearm)  FRAX not available due to age  Last Prolia  February 16, 2017            Current medicines (including changes today)  Current Outpatient Prescriptions   Medication Sig Dispense Refill   • hydroxychloroquine (PLAQUENIL) 200 MG Tab 1 tab po bid 180 Tab 1   • folic acid (FOLVITE) 1 MG Tab TAKE 1 TABLET BY MOUTH EVERY DAY 90 Tab 3   • levothyroxine (SYNTHROID) 100 MCG TABS Take 100 mcg by mouth every day.     • calcium carbonate (OS-KYLE 500) 1250 MG TABS Take 1,250 mg by  mouth every day.     • atenolol (TENORMIN) 25 MG TABS Take 25 mg by mouth every day.     • Rivastigmine Tartrate (EXELON PO) Take  by mouth.     • methotrexate 2.5 MG Tab 4 tabs po qweek 57 Tab 0   • denosumab (PROLIA) 60 MG/ML Solution Inject 60 mg as instructed Once.     • vitamin D (CHOLECALCIFEROL) 1000 UNIT TABS Take 1,000 Units by mouth every day.       Current Facility-Administered Medications   Medication Dose Route Frequency Provider Last Rate Last Dose   • denosumab (PROLIA) subq injection 60 mg  60 mg Subcutaneous Q6 MO Racquel Rousseau M.D.   60 mg at 08/17/17 1015   • denosumab (PROLIA) subq injection 60 mg  60 mg Subcutaneous Q6 MO Racquel Rousseau M.D.   60 mg at 02/16/17 1039     She  has a past medical history of Arthritis; Hypothyroidism; Rheumatoid arthritis (CMS-HCC); and Skin carcinoma.    ROS   Other than what is mentioned in HPI or physical exam, there is no history of headaches, double vision or blurred vision. No temporal tenderness or jaw claudication. No history of cataracts or glaucoma. No trouble swallowing difficulties or sore throats. No history of thyroid disease. No chest complaints including chest pain, cough or sputum production. No shortness of breath. No GI complaints including nausea, vomiting, change in bowel habits, or past peptic ulcer disease. No history of blood in the stools. No urinary complaints including dysuria or frequency. No history of rash including psoriasis. No history of alopecia, photosensitivity, oral ulcerations, Raynaud's phenomena, or swollen joints. No history of gout. No back complaints. No history of low blood counts.       Objective:     Blood pressure 140/60, pulse 78, temperature 37.4 °C (99.3 °F), resp. rate 16, weight 43.5 kg (96 lb), SpO2 93 %. There is no height or weight on file to calculate BMI.   Physical Exam:  Consitution: Alert, oriented X 3 in no acute distress,Eye contact is good, speech goal directed, affect calmHEENT: conjunctiva  non-injected, sclera non-icteric.Pinna normal. TM pearly gray, no injection of TMs. Oral: mucous membranes pink and moist with no lesions,  no parotid gland enlargementNeck: No adenopathy or masses in the neck or supraclavicular regions.Lymph: No supraclavicular lymphadenopathy, no axillary lymphadenopathy, no cervical lymphadenopathyLungs: clear to auscultation bilaterally with good excursion.CV: regular rate and rhythm.Abdomen: soft, nontender, No CVAT, no HSMNeuro: Cranial nerves 2-12 are grossly intact, there is a slight intention tremor of the handsSkin: There are actinic keratoses on face,Ext: Patient has  soft fluctuant not inflamed cysts on the right ulnar styloid,  and inclusion cyst on the palmar aspect of the right hand, nontender to palpation, non-erythematous, no evidence of lymphangitis, small palpable cyst on the dorsal aspect of the right index PIP joint as well as a small cyst on the dorsal aspect of the right fifth MCP joint. Patient also with a nodule on the right elbow. Slight PIP joint nodule, bunion deformities noted on great toes, no crossover toes, and no hammertoes noted on feet,  no ligament laxity noted neither on upper extremities or lower extremities, mild kyphosis of the upper back    Lab Results   Component Value Date/Time    SODIUM 138 08/03/2017 07:51 AM    POTASSIUM 4.2 08/03/2017 07:51 AM    CHLORIDE 107 08/03/2017 07:51 AM    CO2 24 08/03/2017 07:51 AM    GLUCOSE 95 08/03/2017 07:51 AM    BUN 29 (H) 08/03/2017 07:51 AM    CREATININE 0.60 08/03/2017 07:51 AM      Lab Results   Component Value Date/Time    WBC 19.5 (H) 08/07/2017 09:52 AM    RBC 4.09 (L) 08/07/2017 09:52 AM    HEMOGLOBIN 14.1 08/07/2017 09:52 AM    HEMATOCRIT 42.6 08/07/2017 09:52 AM    .2 (H) 08/07/2017 09:52 AM    MCH 34.5 (H) 08/07/2017 09:52 AM    MCHC 33.1 (L) 08/07/2017 09:52 AM    MPV 11.9 08/07/2017 09:52 AM    NEUTSPOLYS 28.80 (L) 08/07/2017 09:52 AM    LYMPHOCYTES 68.50 (H) 08/07/2017 09:52 AM     MONOCYTES 1.80 08/07/2017 09:52 AM    EOSINOPHILS 0.90 08/07/2017 09:52 AM    BASOPHILS 0.00 08/07/2017 09:52 AM      Lab Results   Component Value Date/Time    CALCIUM 9.3 08/03/2017 07:51 AM    ASTSGOT 34 08/03/2017 07:51 AM    ALTSGPT 14 08/03/2017 07:51 AM    ALKPHOSPHAT 64 08/03/2017 07:51 AM    TBILIRUBIN 0.7 08/03/2017 07:51 AM    ALBUMIN 3.7 08/03/2017 07:51 AM    TOTPROTEIN 6.7 08/03/2017 07:51 AM     Results for orders placed in visit on 07/02/15   DS-BONE DENSITY STUDY (DEXA)    Impression According to the World Health Organization classification, bone mineral density of this patient is osteoporosis with high risk of fracture.                INTERPRETING LOCATION:  [address] [place] [address] BRIAN JACOB, [id number]      Assessment and Plan:     1. Seropositive rheumatoid arthritis (CMS-HCC)  Currently off of methotrexate because of recent diagnosis of CLL, started on Plaquenil 200 mg by mouth twice a day to see if we can help with the cystic developments  Today check G6PD level, also recheck rheumatology serologies as well as uric acid.  - hydroxychloroquine (PLAQUENIL) 200 MG Tab; 1 tab po bid  Dispense: 180 Tab; Refill: 1  - G6PD QUANT + RBC; Future  - COMP METABOLIC PANEL; Future  - CBC WITH DIFFERENTIAL; Future  - URIC ACID; Future  - CCP ANTIBODY; Future  - RHEUMATOID ARTHRITIS FACTOR; Future  - DS-BONE DENSITY STUDY (DEXA); Future    2. CLL (chronic lymphocytic leukemia) (CMS-HCC)  Followed by hematology oncology, no treatment at this time  - DS-BONE DENSITY STUDY (DEXA); Future    3. Age-related osteoporosis without current pathological fracture  Last DEXA scan July 2015, will schedule DEXA for reevaluation of osteoporosis  Patient currently on Prolia 60 mg subcutaneous every 6 months   continue calcium 1200 mg by mouth daily and vitamin D about 1000 units by mouth daily and magnesium 400 mg by mouth daily  - hydroxychloroquine (PLAQUENIL) 200 MG Tab; 1 tab po bid  Dispense: 180 Tab; Refill: 1  - G6PD QUANT + RBC;  Future  - COMP METABOLIC PANEL; Future  - CBC WITH DIFFERENTIAL; Future  - URIC ACID; Future  - CCP ANTIBODY; Future  - RHEUMATOID ARTHRITIS FACTOR; Future  - DS-BONE DENSITY STUDY (DEXA); Future    4. Memory loss  - hydroxychloroquine (PLAQUENIL) 200 MG Tab; 1 tab po bid  Dispense: 180 Tab; Refill: 1  - G6PD QUANT + RBC; Future  - COMP METABOLIC PANEL; Future  - CBC WITH DIFFERENTIAL; Future  - URIC ACID; Future  - CCP ANTIBODY; Future  - RHEUMATOID ARTHRITIS FACTOR; Future    Followup: Return in about 6 months (around 5/2/2018). or sooner prn    Patient was seen 30 minutes face-to-face of which more than 50% of the time was spent counseling the patient (excluding time for procedures)  regarding  rheumatological condition and care. Therapy was discussed in detail.    Please note that this dictation was created using voice recognition software. I have made every reasonable attempt to correct obvious errors, but I expect that there are errors of grammar and possibly content that I did not discover before finalizing the note.

## 2017-11-02 NOTE — TELEPHONE ENCOUNTER
Please notify patient that at her last visit Thursday, November 2, 2017 I forgot to mention that patient is due for a bone density scan, I've ordered a bone density scan please mail to patient.

## 2017-11-28 ENCOUNTER — TELEPHONE (OUTPATIENT)
Dept: HEMATOLOGY ONCOLOGY | Facility: MEDICAL CENTER | Age: 82
End: 2017-11-28

## 2017-12-01 ENCOUNTER — APPOINTMENT (OUTPATIENT)
Dept: RADIOLOGY | Facility: MEDICAL CENTER | Age: 82
End: 2017-12-01
Attending: INTERNAL MEDICINE
Payer: MEDICARE

## 2017-12-27 ENCOUNTER — HOSPITAL ENCOUNTER (OUTPATIENT)
Dept: RADIOLOGY | Facility: MEDICAL CENTER | Age: 82
End: 2017-12-27
Attending: INTERNAL MEDICINE
Payer: MEDICARE

## 2017-12-27 DIAGNOSIS — M81.0 AGE-RELATED OSTEOPOROSIS WITHOUT CURRENT PATHOLOGICAL FRACTURE: ICD-10-CM

## 2017-12-27 DIAGNOSIS — M05.9 SEROPOSITIVE RHEUMATOID ARTHRITIS (HCC): ICD-10-CM

## 2017-12-27 DIAGNOSIS — C91.10 CLL (CHRONIC LYMPHOCYTIC LEUKEMIA) (HCC): ICD-10-CM

## 2017-12-27 PROCEDURE — 77080 DXA BONE DENSITY AXIAL: CPT

## 2017-12-28 ENCOUNTER — TELEPHONE (OUTPATIENT)
Dept: RHEUMATOLOGY | Facility: PHYSICIAN GROUP | Age: 82
End: 2017-12-28

## 2017-12-28 NOTE — TELEPHONE ENCOUNTER
Spoke with patients son Adrian, I relayed your message and he will relay the message to her. Thank you

## 2017-12-28 NOTE — TELEPHONE ENCOUNTER
Please notify patient that we received the results of her bone density scan and it looks about the same as the one that she had in 2015 which tells us that the Prolene is protecting her from getting worse  Recommend we continue Prolia 60 mg subcutaneous every 6 months   continue calcium 1200 mg by mouth daily and vitamin D about 1000 units by mouth daily and magnesium 400 mg by mouth daily    And we will see patient at her upcoming appointment in May 2, 2018

## 2018-05-02 ENCOUNTER — OFFICE VISIT (OUTPATIENT)
Dept: RHEUMATOLOGY | Facility: PHYSICIAN GROUP | Age: 83
End: 2018-05-02
Payer: MEDICARE

## 2018-05-02 VITALS
SYSTOLIC BLOOD PRESSURE: 98 MMHG | HEART RATE: 72 BPM | WEIGHT: 90 LBS | DIASTOLIC BLOOD PRESSURE: 50 MMHG | OXYGEN SATURATION: 93 % | TEMPERATURE: 98.2 F | RESPIRATION RATE: 14 BRPM

## 2018-05-02 DIAGNOSIS — M05.9 SEROPOSITIVE RHEUMATOID ARTHRITIS (HCC): ICD-10-CM

## 2018-05-02 DIAGNOSIS — Z79.899 LONG-TERM USE OF PLAQUENIL: ICD-10-CM

## 2018-05-02 DIAGNOSIS — M81.0 AGE-RELATED OSTEOPOROSIS WITHOUT CURRENT PATHOLOGICAL FRACTURE: ICD-10-CM

## 2018-05-02 DIAGNOSIS — R41.3 MEMORY LOSS: ICD-10-CM

## 2018-05-02 DIAGNOSIS — C91.10 CLL (CHRONIC LYMPHOCYTIC LEUKEMIA) (HCC): ICD-10-CM

## 2018-05-02 PROCEDURE — 99214 OFFICE O/P EST MOD 30 MIN: CPT | Performed by: INTERNAL MEDICINE

## 2018-05-02 RX ORDER — HYDROXYCHLOROQUINE SULFATE 200 MG/1
TABLET, FILM COATED ORAL
Qty: 180 TAB | Refills: 0 | Status: SHIPPED | OUTPATIENT
Start: 2018-05-02 | End: 2018-10-08 | Stop reason: SDUPTHER

## 2018-05-02 NOTE — LETTER
[facility]   [address], [address]  [place] [address]  Phone: [phone]  Fax: [phone]              Encounter Date: 5/2/2018    Dear Dr. Scott ref. provider found,    It was a pleasure seeing your patient, Pham Sparks, on 5/2/2018. Diagnoses of Seropositive rheumatoid arthritis (HCC), Long-term use of Plaquenil, CLL (chronic lymphocytic leukemia) (HCC), Age-related osteoporosis without current pathological fracture, and Memory loss were pertinent to this visit.     Please find attached progress note which includes the history I obtained from Ms. Sparks, my physical examination findings, my impression and recommendations.      Once again, it was a pleasure participating in your patient's care.  Please feel free to contact me if you have any questions or if I can be of any further assistance to your patients.      Sincerely,    Racquel Rousseau M.D.  Electronically Signed          PROGRESS NOTE:  No notes on file

## 2018-05-02 NOTE — PROGRESS NOTES
Chief Complaint- joint pain    Subjective:   Pham Sparks is a 89 y.o. female here today for follow up of rheumatological issues    This is a pleasant elderly patient here with her son who has Seropositive rheumatoid arthritis who at last visit was found to have CLL and at that time methotrexate was stopped and replaced with Plaquenil 200 mg by mouth twice a day. Patient here stating that she doesn't have any problems with medication, she occasionally gets stomach cramps but that is not new with this medication.     Here to f/u, doing well, here with her son, no complaints, patient has lost about 7 pounds since about a year ago, states that she is not very hungry, son states that she takes about 3 bites of food and then patient states that she's full, no recurrent infections, no FUO.     Patient also with osteoporosis, currently on Prolia.Patient sees dermatology on a regular basis for actinic keratoses on the face.         S/p Fosamax     Left TKA     CCP >250 1/2018-[facility]  RF 64 1/2018-[facility]  G6PD 13 adequate 1/2018-[facility]  Uric acid 2.5 1/2018 [facility]   DEXA 7/2015 T scores -2.2, -6.8,   DEXA 12/27/2017 T scores -3.0, -6.6 (forearm)  FRAX not available due to age  Last Prolia  February 16, 2017               Current medicines (including changes today)  Current Outpatient Prescriptions   Medication Sig Dispense Refill   • hydroxychloroquine (PLAQUENIL) 200 MG Tab 1 tab po bid 180 Tab 0   • folic acid (FOLVITE) 1 MG Tab TAKE 1 TABLET BY MOUTH EVERY DAY 90 Tab 3   • levothyroxine (SYNTHROID) 100 MCG TABS Take 100 mcg by mouth every day.     • calcium carbonate (OS-KYLE 500) 1250 MG TABS Take 1,250 mg by mouth every day.     • atenolol (TENORMIN) 25 MG TABS Take 25 mg by mouth every day.     • Rivastigmine Tartrate (EXELON PO) Take  by mouth.     • methotrexate 2.5 MG Tab 4 tabs po  qweek 57 Tab 0   • denosumab (PROLIA) 60 MG/ML Solution Inject 60 mg as instructed Once.     • vitamin D (CHOLECALCIFEROL) 1000 UNIT TABS Take 1,000 Units by mouth every day.       Current Facility-Administered Medications   Medication Dose Route Frequency Provider Last Rate Last Dose   • denosumab (PROLIA) subq injection 60 mg  60 mg Subcutaneous Q6 MO Racquel Rousseau M.D.   60 mg at 08/17/17 1015   • denosumab (PROLIA) subq injection 60 mg  60 mg Subcutaneous Q6 MO Racquel Rousseau M.D.   60 mg at 02/16/17 1039     She  has a past medical history of Arthritis; Hypothyroidism; Rheumatoid arthritis(714.0); and Skin carcinoma.    ROS   Other than what is mentioned in HPI or physical exam, there is no history of headaches, double vision or blurred vision. No temporal tenderness or jaw claudication. No history of cataracts or glaucoma. No trouble swallowing difficulties or sore throats. No history of thyroid disease. No chest complaints including chest pain, cough or sputum production. No shortness of breath. No GI complaints including nausea, vomiting, change in bowel habits, or past peptic ulcer disease. No history of blood in the stools. No urinary complaints including dysuria or frequency. No history of rash including psoriasis. No history of alopecia, photosensitivity, oral ulcerations, Raynaud's phenomena, or swollen joints. No history of gout. No back complaints. No history of low blood counts.       Objective:     Blood pressure (!) 98/50, pulse 72, temperature 36.8 °C (98.2 °F), resp. rate 14, weight 40.8 kg (90 lb), SpO2 93 %. There is no height or weight on file to calculate BMI.   Physical Exam:  Consitution: Alert, oriented X 3 in no acute distress, patient responds appropriately when addressed Eye contact is good, speech goal directed, affect calmHEENT: conjunctiva non-injected, sclera non-icteric.Pinna normal. TM pearly gray, no injection of TMs. Oral: mucous membranes pink and moist with no  lesions,  no parotid gland enlargementNeck: No adenopathy or masses in the neck or supraclavicular regions.Lymph: No supraclavicular lymphadenopathy, no axillary lymphadenopathy, no cervical lymphadenopathyLungs: clear to auscultation bilaterally with good excursion.CV: regular rate and rhythm.Abdomen: soft, nontender, No CVAT, no HSMNeuro: Cranial nerves 2-12 are grossly intact, there is a slight intention tremor of the handsSkin: There are actinic keratoses on face,Ext: Patient has  soft fluctuant not inflamed cysts on the right ulnar styloid, small palpable cyst on the dorsal aspect of the right index PIP joint as well as a small cyst on the dorsal aspect of the right fifth MCP joint. Slight PIP joint nodule, bunion deformities noted on great toes, no crossover toes, and no hammertoes noted on feet,  no ligament laxity noted neither on upper extremities or lower extremities, mild kyphosis of the upper back            Lab Results   Component Value Date/Time    SODIUM 138 08/03/2017 07:51 AM    POTASSIUM 4.2 08/03/2017 07:51 AM    CHLORIDE 107 08/03/2017 07:51 AM    CO2 24 08/03/2017 07:51 AM    GLUCOSE 95 08/03/2017 07:51 AM    BUN 29 (H) 08/03/2017 07:51 AM    CREATININE 0.60 08/03/2017 07:51 AM      Lab Results   Component Value Date/Time    WBC 19.5 (H) 08/07/2017 09:52 AM    RBC 4.09 (L) 08/07/2017 09:52 AM    HEMOGLOBIN 14.1 08/07/2017 09:52 AM    HEMATOCRIT 42.6 08/07/2017 09:52 AM    .2 (H) 08/07/2017 09:52 AM    MCH 34.5 (H) 08/07/2017 09:52 AM    MCHC 33.1 (L) 08/07/2017 09:52 AM    MPV 11.9 08/07/2017 09:52 AM    NEUTSPOLYS 28.80 (L) 08/07/2017 09:52 AM    LYMPHOCYTES 68.50 (H) 08/07/2017 09:52 AM    MONOCYTES 1.80 08/07/2017 09:52 AM    EOSINOPHILS 0.90 08/07/2017 09:52 AM    BASOPHILS 0.00 08/07/2017 09:52 AM      Lab Results   Component Value Date/Time    CALCIUM 9.3 08/03/2017 07:51 AM    ASTSGOT 34 08/03/2017 07:51 AM    ALTSGPT 14 08/03/2017 07:51 AM    ALKPHOSPHAT 64 08/03/2017 07:51 AM     TBILIRUBIN 0.7 08/03/2017 07:51 AM    ALBUMIN 3.7 08/03/2017 07:51 AM    TOTPROTEIN 6.7 08/03/2017 07:51 AM     Results for orders placed during the hospital encounter of 12/27/17   DS-BONE DENSITY STUDY (DEXA)    Impression According to the World Health Organization classification, bone mineral density of this patient is osteoporotic and without significant change.    FRAX score not obtained for this patient due to age.    INTERPRETING LOCATION:  [address] [place] [address], [address]      Assessment and Plan:     1. Seropositive rheumatoid arthritis (HCC)  Stable on Plaquenil 200 mg by mouth twice a day, of note G6PD levels are adequate, patient will need ophthalmology evaluation every year, will check status at next visit and patient will need CBC every 6 months.  - hydroxychloroquine (PLAQUENIL) 200 MG Tab; 1 tab po bid  Dispense: 180 Tab; Refill: 0    2. Long-term use of Plaquenil  Of note G6PD levels are adequate, patient will need CBC every 6 months and ophthalmology evaluation every year  - hydroxychloroquine (PLAQUENIL) 200 MG Tab; 1 tab po bid  Dispense: 180 Tab; Refill: 0    3. CLL (chronic lymphocytic leukemia) ([facility])  Followed by hematology, current WBC count is 20,000  - hydroxychloroquine (PLAQUENIL) 200 MG Tab; 1 tab po bid  Dispense: 180 Tab; Refill: 0    4. Age-related osteoporosis without current pathological fracture  Last DEXA December 2017, next DEXA December 2019  Currently on Prolia 60 mg subcutaneous every 6 months   continue calcium 1200 mg by mouth daily and vitamin D about 1000 units by mouth daily and magnesium 400 mg by mouth daily  - hydroxychloroquine (PLAQUENIL) 200 MG Tab; 1 tab po bid  Dispense: 180 Tab; Refill: 0    5. Memory loss  - hydroxychloroquine (PLAQUENIL) 200 MG Tab; 1 tab po bid  Dispense: 180 Tab; Refill: 0    Followup: Return in about 6 months (around 11/2/2018). or sooner prn    Patient was seen 30 minutes face-to-face of which more than 50% of the time was spent counseling  the patient (excluding time for procedures)  regarding  rheumatological condition and care. Therapy was discussed in detail.    Please note that this dictation was created using voice recognition software. I have made every reasonable attempt to correct obvious errors, but I expect that there are errors of grammar and possibly content that I did not discover before finalizing the note.

## 2018-10-08 ENCOUNTER — OFFICE VISIT (OUTPATIENT)
Dept: RHEUMATOLOGY | Facility: MEDICAL CENTER | Age: 83
End: 2018-10-08
Payer: MEDICARE

## 2018-10-08 VITALS
TEMPERATURE: 98.4 F | DIASTOLIC BLOOD PRESSURE: 58 MMHG | HEART RATE: 64 BPM | WEIGHT: 88 LBS | OXYGEN SATURATION: 88 % | RESPIRATION RATE: 12 BRPM | SYSTOLIC BLOOD PRESSURE: 110 MMHG

## 2018-10-08 DIAGNOSIS — M81.0 AGE-RELATED OSTEOPOROSIS WITHOUT CURRENT PATHOLOGICAL FRACTURE: ICD-10-CM

## 2018-10-08 DIAGNOSIS — C91.10 CLL (CHRONIC LYMPHOCYTIC LEUKEMIA) (HCC): ICD-10-CM

## 2018-10-08 DIAGNOSIS — Z79.899 LONG-TERM USE OF PLAQUENIL: ICD-10-CM

## 2018-10-08 DIAGNOSIS — R41.3 MEMORY LOSS: ICD-10-CM

## 2018-10-08 DIAGNOSIS — M05.9 SEROPOSITIVE RHEUMATOID ARTHRITIS (HCC): ICD-10-CM

## 2018-10-08 PROCEDURE — 99214 OFFICE O/P EST MOD 30 MIN: CPT | Performed by: INTERNAL MEDICINE

## 2018-10-08 RX ORDER — METOPROLOL SUCCINATE 25 MG/1
25 TABLET, EXTENDED RELEASE ORAL DAILY
COMMUNITY

## 2018-10-08 RX ORDER — HYDROXYCHLOROQUINE SULFATE 200 MG/1
TABLET, FILM COATED ORAL
Qty: 180 TAB | Refills: 0 | Status: SHIPPED | OUTPATIENT
Start: 2018-10-08

## 2018-10-08 NOTE — PROGRESS NOTES
Chief Complaint- joint pain    Subjective:   Pham Sparks is a 89 y.o. female here today for follow up of rheumatological issues    This is a follow-up visit for this pleasant elderly patient who is taken care of by her son who we see for seropositive rheumatoid arthritis currently on Plaquenil at 200 mg p.o. twice daily.  Additional comorbidities include a diagnosis of CLL which complicates treatment.  Patient's last evaluation by oncology was August 2017 and was told to follow-up as needed,  Patient denies any side effects from the medication, denies any unexplained weight loss, denies any fevers of unknown etiology, denies any GI upset, denies any rashes, denies any new joint swelling, denies recurrent infections.     Patient also with osteoporosis, currently on Prolia.Patient sees dermatology on a regular basis for actinic keratoses on the face.         S/p Fosamax     Left TKA     CCP >250 1/2018-[facility]  RF 64 1/2018-[facility]  G6PD 13 adequate 1/2018-[facility]  Uric acid 2.5 1/2018 [facility]   DEXA 7/2015 T scores -2.2, -6.8,   DEXA 12/27/2017 T scores -3.0, -6.6 (forearm)  FRAX not available due to age  Last Prolia  February 16, 2017            Current medicines (including changes today)  Current Outpatient Prescriptions   Medication Sig Dispense Refill   • metoprolol SR (TOPROL XL) 25 MG TABLET SR 24 HR Take 25 mg by mouth every day.     • hydroxychloroquine (PLAQUENIL) 200 MG Tab 1 tab po bid 180 Tab 0   • folic acid (FOLVITE) 1 MG Tab TAKE 1 TABLET BY MOUTH EVERY DAY 90 Tab 3   • levothyroxine (SYNTHROID) 100 MCG TABS Take 100 mcg by mouth every day.     • calcium carbonate (OS-KYLE 500) 1250 MG TABS Take 1,250 mg by mouth every day.     • Rivastigmine Tartrate (EXELON PO) Take  by mouth.     • methotrexate 2.5 MG Tab 4 tabs po qweek 57 Tab 0   • denosumab (PROLIA) 60 MG/ML  Solution Inject 60 mg as instructed Once.     • atenolol (TENORMIN) 25 MG TABS Take 25 mg by mouth every day.     • vitamin D (CHOLECALCIFEROL) 1000 UNIT TABS Take 1,000 Units by mouth every day.       Current Facility-Administered Medications   Medication Dose Route Frequency Provider Last Rate Last Dose   • denosumab (PROLIA) subq injection 60 mg  60 mg Subcutaneous Q6 MO Racquel Rousseau M.D.   60 mg at 08/17/17 1015   • denosumab (PROLIA) subq injection 60 mg  60 mg Subcutaneous Q6 MO Racquel Rousseau M.D.   60 mg at 02/16/17 1039     She  has a past medical history of Arthritis; Hypothyroidism; Rheumatoid arthritis(714.0); and Skin carcinoma.    ROS   Other than what is mentioned in HPI or physical exam, there is no history of headaches, double vision or blurred vision. No temporal tenderness or jaw claudication. No history of cataracts or glaucoma. No trouble swallowing difficulties or sore throats.  No chest complaints including chest pain, cough or sputum production. No GI complaints including nausea, vomiting, change in bowel habits, or past peptic ulcer disease. No history of blood in the stools. No urinary complaints including dysuria or frequency. No history of alopecia, photosensitivity, oral ulcerations, Raynaud's phenomena.       Objective:     Blood pressure 110/58, pulse 64, temperature 36.9 °C (98.4 °F), temperature source Temporal, resp. rate 12, weight 39.9 kg (88 lb), SpO2 88 %. There is no height or weight on file to calculate BMI.   Physical Exam:    Constitutional: Alert and oriented X3, patient is talkative with good eye contact.Skin: Warm, dry, good turgor, no rashes in visible areas, patient does have multiple actinic keratoses.Eye: Equal, round and reactive, conjunctiva clear, lids normal EOM intactENMT: Lips without lesions, good dentition, no oropharyngeal ulcers, moist buccal mucosa, pinna without deformityNeck: Trachea midline, no masses, no thyromegaly.Lymph:  No cervical  lymphadenopathy, no axillary lymphadenopathy, no supraclavicular lymphadenopathyRespiratory: Unlabored respiratory effort, lungs clear to auscultation, no wheezes, no ronchi.Cardiovascular: Normal S1, S2, no murmur, no edema.Abdomen: Soft, non-tender, no masses, no hepatosplenomegaly.Psych: Alert and oriented x3, normal affect and mood.Neuro: Cranial nerves 2-12 are grossly intact, no loss of sensation LEExt:no joint laxity noted in bilateral arms, no joint laxity noted in bilateral legs, no tender swollen joints, there is evidence of Heberden's nodes on most DIP joints, no dactylitis no sausage digits mild ulnar styloid prominence but no tenderness to palpation feet without crossover toes without hammertoes mild kyphosis of the upper back    Lab Results   Component Value Date/Time    SODIUM 138 08/03/2017 07:51 AM    POTASSIUM 4.2 08/03/2017 07:51 AM    CHLORIDE 107 08/03/2017 07:51 AM    CO2 24 08/03/2017 07:51 AM    GLUCOSE 95 08/03/2017 07:51 AM    BUN 29 (H) 08/03/2017 07:51 AM    CREATININE 0.60 08/03/2017 07:51 AM      Lab Results   Component Value Date/Time    WBC 19.5 (H) 08/07/2017 09:52 AM    RBC 4.09 (L) 08/07/2017 09:52 AM    HEMOGLOBIN 14.1 08/07/2017 09:52 AM    HEMATOCRIT 42.6 08/07/2017 09:52 AM    .2 (H) 08/07/2017 09:52 AM    MCH 34.5 (H) 08/07/2017 09:52 AM    MCHC 33.1 (L) 08/07/2017 09:52 AM    MPV 11.9 08/07/2017 09:52 AM    NEUTSPOLYS 28.80 (L) 08/07/2017 09:52 AM    LYMPHOCYTES 68.50 (H) 08/07/2017 09:52 AM    MONOCYTES 1.80 08/07/2017 09:52 AM    EOSINOPHILS 0.90 08/07/2017 09:52 AM    BASOPHILS 0.00 08/07/2017 09:52 AM      Lab Results   Component Value Date/Time    CALCIUM 9.3 08/03/2017 07:51 AM    ASTSGOT 34 08/03/2017 07:51 AM    ALTSGPT 14 08/03/2017 07:51 AM    ALKPHOSPHAT 64 08/03/2017 07:51 AM    TBILIRUBIN 0.7 08/03/2017 07:51 AM    ALBUMIN 3.7 08/03/2017 07:51 AM    TOTPROTEIN 6.7 08/03/2017 07:51 AM     Results for orders placed during the hospital encounter of 12/27/17    DS-BONE DENSITY STUDY (DEXA)    Impression According to the World Health Organization classification, bone mineral density of this patient is osteoporotic and without significant change.    FRAX score not obtained for this patient due to age.    INTERPRETING LOCATION:  [address] [place] [address], [address]      Assessment and Plan:     1. Seropositive rheumatoid arthritis (HCC)  Stable on Plaquenil at 200 mg p.o. twice daily  - COMP METABOLIC PANEL; Future  - CBC WITH DIFFERENTIAL; Future  - WESTERGREN SED RATE; Future  - hydroxychloroquine (PLAQUENIL) 200 MG Tab; 1 tab po bid  Dispense: 180 Tab; Refill: 0    2. Long-term use of Plaquenil  Of note G6PD levels are adequate patient has ophthalmology evaluation pending November 2018  Patient needs labs every 6 months patient due for labs labs ordered for patient  - COMP METABOLIC PANEL; Future  - CBC WITH DIFFERENTIAL; Future  - WESTERGREN SED RATE; Future  - hydroxychloroquine (PLAQUENIL) 200 MG Tab; 1 tab po bid  Dispense: 180 Tab; Refill: 0    3. Age-related osteoporosis without current pathological fracture  Last DEXA December 2017, next DEXA December 2019  Patient currently takes Prolia every 6 months   continue calcium 1200 mg by mouth daily and vitamin D about 1000 units by mouth daily and magnesium 400 mg by mouth daily  - COMP METABOLIC PANEL; Future  - CBC WITH DIFFERENTIAL; Future  - WESTERGREN SED RATE; Future  - hydroxychloroquine (PLAQUENIL) 200 MG Tab; 1 tab po bid  Dispense: 180 Tab; Refill: 0    4. CLL (chronic lymphocytic leukemia) ([facility])  Last evaluation by oncology August 2017 was told to follow-up as needed  If CBC shows changes will refer back to oncology for reevaluation  - COMP METABOLIC PANEL; Future  - CBC WITH DIFFERENTIAL; Future  - WESTERGREN SED RATE; Future  - hydroxychloroquine (PLAQUENIL) 200 MG Tab; 1 tab po bid  Dispense: 180 Tab; Refill: 0    5. Memory loss  - hydroxychloroquine (PLAQUENIL) 200 MG Tab; 1 tab po bid  Dispense: 180 Tab;  Refill: 0    Followup: Return in about 6 months (around 4/8/2019). or sooner rosita GASTELUM Candelariorosalina was seen 30 minutes face-to-face of which more than 50% of the time was spent counseling the patient (excluding time for procedures)  regarding  rheumatological condition and care. Therapy was discussed in detail.    Please note that this dictation was created using voice recognition software. I have made every reasonable attempt to correct obvious errors, but I expect that there are errors of grammar and possibly content that I did not discover before finalizing the note.

## 2018-10-08 NOTE — LETTER
[facility]   [address] [place] [address], [address]  [place] [address]  Phone: [phone]  Fax: [phone]              Encounter Date: 10/8/2018    Dear Dr. Scott ref. provider found,    It was a pleasure seeing your patient, Pham Sparks, on 10/8/2018. Diagnoses of Seropositive rheumatoid arthritis (HCC), Long-term use of Plaquenil, Age-related osteoporosis without current pathological fracture, CLL (chronic lymphocytic leukemia) ([facility]), and Memory loss were pertinent to this visit.     Please find attached progress note which includes the history I obtained from Ms. Sparks, my physical examination findings, my impression and recommendations.      Once again, it was a pleasure participating in your patient's care.  Please feel free to contact me if you have any questions or if I can be of any further assistance to your patients.      Sincerely,    Racquel Rousseau M.D.  Electronically Signed          PROGRESS NOTE:  No notes on file

## 2018-10-30 ENCOUNTER — TELEPHONE (OUTPATIENT)
Dept: RHEUMATOLOGY | Facility: MEDICAL CENTER | Age: 83
End: 2018-10-30

## 2018-10-30 NOTE — TELEPHONE ENCOUNTER
Pt's  called and would like to speak to you in regards to pt's labs. Please, call him back today if possible.

## 2018-10-30 NOTE — TELEPHONE ENCOUNTER
Please notify patient that her blood tests are stable, patient does not need to check with oncology as of yet, (patient with CLL), will check patient again at her  upcoming appointment May 2019.

## 2018-10-31 NOTE — TELEPHONE ENCOUNTER
Spoke with pts  and relayed your message.     He stated that she is having about 3-4 days a week of diarrhea. He is wondering if if could be from the Plaquenil? He decreased the dose to 1 po q day and she is still having issues.. She is no longer on the MTX.  Please advise and thank you

## 2018-10-31 NOTE — TELEPHONE ENCOUNTER
Spoke with pts  and relayed your message. He will have her stop the Plaquenil. Her joints are not giving her any problems right now so we will wait on the Sulfasalazine.  Thank you

## 2018-10-31 NOTE — TELEPHONE ENCOUNTER
The plaquenil could be causing the diarrhea,   Lets have the patient cold turkey stop the Plaquenil    How is patient's joints doing?  If patient's joints are still bothering her we can switch to a medication called sulfasalazine we would do 1 tablet twice a day

## 2019-04-08 ENCOUNTER — APPOINTMENT (OUTPATIENT)
Dept: RHEUMATOLOGY | Facility: MEDICAL CENTER | Age: 84
End: 2019-04-08
Payer: MEDICARE

## 2020-06-29 ENCOUNTER — TELEPHONE (OUTPATIENT)
Dept: RHEUMATOLOGY | Facility: MEDICAL CENTER | Age: 85
End: 2020-06-29

## 2020-06-29 NOTE — TELEPHONE ENCOUNTER
Pt's Son stopped by and wanted you to know that his Mom passed away on 1/5/2019. He is requesting a note or letter from you stating when you first noticed that his Mom's mind was starting to go. They have been having lot's of issues with her trust and this might help eliminate some confusion and problems. Please, call Adrian (Son) if you have any further questions and when he can pickup the letter.

## 2020-06-29 NOTE — TELEPHONE ENCOUNTER
Reviewed patients chart, the first mention of memory loss and alzeimers by me was Sheela 10, 2015  Letter written, please notify patient    Also notify that they may want to access patients chart from other physicians including patients neurologists if they are looking for earlier documentation

## 2020-06-30 NOTE — TELEPHONE ENCOUNTER
L/M for Adrian (Son) to call me back regarding picking up the note & MD's message I need to relay to him.

## 2020-06-30 NOTE — TELEPHONE ENCOUNTER
Adrian (Son) stopped by to  note and I gave him your message. He said Thank You so much for your help with this matter.